# Patient Record
Sex: FEMALE | Race: WHITE | Employment: OTHER | URBAN - METROPOLITAN AREA
[De-identification: names, ages, dates, MRNs, and addresses within clinical notes are randomized per-mention and may not be internally consistent; named-entity substitution may affect disease eponyms.]

---

## 2017-01-11 ENCOUNTER — TELEPHONE (OUTPATIENT)
Dept: FAMILY MEDICINE | Facility: OTHER | Age: 67
End: 2017-01-11

## 2017-01-11 NOTE — TELEPHONE ENCOUNTER
Summary:    Patient is due/failing the following:   Dexa scan  and MAMMOGRAM    Action needed:   Schedule a mammogram and Dexa scan     Type of outreach:    Phone, left message for patient to call back.     Questions for provider review:    None                                                                                                                                    Toya Rizo       Chart routed to Care Team .      Panel Management Review      Patient has the following on her problem list:     Depression / Dysthymia review  PHQ-9 SCORE 4/30/2014 5/13/2016 11/11/2016   Total Score 0 - -   Total Score - 0 0      Patient is due for:  None    Asthma review     ACT Total Scores 12/14/2016   ACT TOTAL SCORE (Goal Greater than or Equal to 20) 17   In the past 12 months, how many times did you visit the emergency room for your asthma without being admitted to the hospital? 0   In the past 12 months, how many times were you hospitalized overnight because of your asthma? 0      1. Is Asthma diagnosis on the Problem List? Yes    2. Is Asthma listed on Health Maintenance? Yes    3. Patient is due for:  ACT      Composite cancer screening  Chart review shows that this patient is due/due soon for the following Mammogram

## 2017-01-11 NOTE — Clinical Note
[facility]  [address]   [place] [address]  Phone: [phone]  January 16, 2017      Bharath Kennedy  [address]  [facility] [address]      Dear Bharath,    We care about your health and have reviewed your health plan including your medical conditions, medications, and lab results.  Based on this review, it is recommended that you follow up regarding the following health topic(s):  -Breast Cancer Screening    We recommend you take the following action(s):  -schedule a MAMMOGRAM which is due. Please disregard this reminder if you have had this exam elsewhere within the last 1-2 years please let us know so we can update your records.     Please call us at the [facility] - [phone] (or use [employer]) to address the above recommendations.     Thank you for trusting [facility] and we appreciate the opportunity to serve you.  We look forward to supporting your healthcare needs in the future.    Healthy Regards,    Your Health Care Team  [facility]

## 2017-04-05 NOTE — PROGRESS NOTES
[address]  [facility] [address]  [phone]  Dept: [phone]    PRE-OP EVALUATION:  Today's date: 2017    Bharath Kennedy (: 1950) presents for pre-operative evaluation assessment as requested by Dr. Miles Hayes.  She requires evaluation and anesthesia risk assessment prior to undergoing surgery/procedure for treatment of Cataract .  Proposed procedure: Cataract surgery    Date of Surgery/ Procedure: 17, 17  Time of Surgery/ Procedure: [facility]  Hospital/Surgical Facility: [facility]  Fax number for surgical facility: [phone]  Primary Physician: Precious Hay  Type of Anesthesia Anticipated: to be determined    Patient has a Health Care Directive or Living Will:  NO    Preop Questions 4/10/2017   1.  Do you have a history of heart attack, stroke, stent, bypass or surgery on an artery in the head, neck, heart or legs? No   2.  Do you ever have any pain or discomfort in your chest? No   3.  Do you have a history of  Heart Failure? No   4.   Are you troubled by shortness of breath when:  walking on a level surface, or up a slight hill, or at night? No   5.  Do you currently have a cold, bronchitis or other respiratory infection? No   6.  Do you have a cough, shortness of breath, or wheezing? No   7.  Do you sometimes get pains in the calves of your legs when you walk? No   8. Do you or anyone in your family have previous history of blood clots? No   9.  Do you or does anyone in your family have a serious bleeding problem such as prolonged bleeding following surgeries or cuts? No   10. Have you ever had problems with anemia or been told to take iron pills? No   11. Have you had any abnormal blood loss such as black, tarry or bloody stools, or abnormal vaginal bleeding? No   12. Have you ever had a blood transfusion? No   13. Have you or any of your relatives ever had problems with anesthesia? No   14. Do you have sleep apnea,  excessive snoring or daytime drowsiness? No   15. Do you have any prosthetic heart valves? No   16. Do you have prosthetic joints? No   17. Is there any chance that you may be pregnant? No         HPI:                                                      Brief HPI related to upcoming procedure: cataracts      See problem list for active medical problems.  Problems all longstanding and stable, except as noted/documented.  See ROS for pertinent symptoms related to these conditions.                                                                                                  .    MEDICAL HISTORY:                                                      Patient Active Problem List    Diagnosis Date Noted     Moderate persistent asthma without complication 12/14/2016     Priority: Medium     Allergic rhinitis due to dust mite 12/14/2016     Priority: Medium     Allergic rhinitis due to cat hair 12/14/2016     Priority: Medium     Seasonal allergic rhinitis due to pollen 12/14/2016     Priority: Medium     Environmental allergies 11/15/2016     Priority: Medium     Major depression in complete remission (H) 01/09/2012     Priority: Medium     Anxiety 06/25/2008     Priority: Medium      Past Medical History:   Diagnosis Date     Acne vulgaris      Depressive disorder, not elsewhere classified      Displacement of intervertebral disc, site unspecified, without myelopathy     L5-S1      Moderate persistent asthma without complication 12/14/2016     Obesity, unspecified      Past Surgical History:   Procedure Laterality Date     COLONOSCOPY  2008    [facility]     HC TOOTH EXTRACTION W/FORCEP       LAMINECT/DISCECTOMY, LUMBAR  06/26/08    Left L5-S1 discectomy for herniated disc & left leg pain.     TONSILLECTOMY       Current Outpatient Prescriptions   Medication Sig Dispense Refill     citalopram (CELEXA) 20 MG tablet Take 1 tablet (20 mg) by mouth daily 90 tablet 3     butalbital-acetaminophen-caffeine (FIORICET/ESGIC)  "-40 MG per tablet TAKE ONE TABLET BY MOUTH EVERY 4 HOURS AS NEEDED FOR PAIN 30 tablet 0     [DISCONTINUED] citalopram (CELEXA) 20 MG tablet Take 1 tablet (20 mg) by mouth daily 90 tablet 3     OTC products: None, except as noted above    Allergies   Allergen Reactions     Amoxicillin      fever     Nitrofurantoin      Fever at noc     Penicillins      Rash/itch        Latex Allergy: NO    Social History   Substance Use Topics     Smoking status: Never Smoker     Smokeless tobacco: Never Used      Comment: no smokers in household     Alcohol use No     History   Drug Use No       REVIEW OF SYSTEMS:                                                    C: NEGATIVE for fever, chills, change in weight  I: NEGATIVE for worrisome rashes, moles or lesions  EYES:  Bilateral cataracts  E/M: NEGATIVE for ear, mouth and throat problems  R: NEGATIVE for significant cough or SOB  CV: NEGATIVE for chest pain, palpitations or peripheral edema  GI: NEGATIVE for nausea, abdominal pain, heartburn, or change in bowel habits  : NEGATIVE for frequency, dysuria, or hematuria  M: NEGATIVE for significant arthralgias or myalgia  N: NEGATIVE for weakness, dizziness or paresthesias  E: NEGATIVE for temperature intolerance, skin/hair changes  P: NEGATIVE for changes in mood or affect    EXAM:                                                    /70  Pulse 76  Temp 97.5  F (36.4  C) (Temporal)  Resp 12  Ht 5' 4.76\" (1.645 m)  Wt 210 lb (95.3 kg)  LMP 01/01/2005  BMI 35.2 kg/m2    GENERAL APPEARANCE: healthy, alert and no distress     EYES: EOMI, PERRL     HENT: ear canals and TM's normal and nose and mouth without ulcers or lesions     NECK: no adenopathy, no asymmetry, masses, or scars and thyroid normal to palpation     RESP: lungs clear to auscultation - no rales, rhonchi or wheezes     CV: regular rates and rhythm, normal S1 S2, no S3 or S4 and no murmur, click or rub     ABDOMEN:  soft, nontender, no HSM or masses and " bowel sounds normal     MS: extremities normal- no gross deformities noted, no evidence of inflammation in joints, FROM in all extremities.Has ecchymoses and healing cuts on left lower leg.     SKIN: no suspicious lesions or rashes     NEURO: Normal strength and tone, sensory exam grossly normal, mentation intact and speech normal     PSYCH: mentation appears normal. and affect normal/bright    DIAGNOSTICS:                                                    No labs or EKG required for low risk surgery (cataract, skin procedure, breast biopsy, etc)    Recent Labs   Lab Test  11/11/16   0913  05/13/16   0834   HGB  13.6   --    PLT  238   --    A1C   --   5.9     IMPRESSION:                                                    Reason for surgery/procedure: cataract    The proposed surgical procedure is considered LOW risk.    REVISED CARDIAC RISK INDEX  The patient has the following serious cardiovascular risks for perioperative complications such as (MI, PE, VFib and 3  AV Block):  No serious cardiac risks  INTERPRETATION: 0 risks: Class I (very low risk - 0.4% complication rate)    The patient has the following additional risks for perioperative complications:  No identified additional risks      ICD-10-CM    1. Preop general physical exam Z01.818    2. Cataract H26.9    3. Major depression in complete remission (H) F32.5 citalopram (CELEXA) 20 MG tablet   4. Anxiety F41.9 citalopram (CELEXA) 20 MG tablet       RECOMMENDATIONS:                                                          --Patient is to take all scheduled medications on the day of surgery    APPROVAL GIVEN to proceed with proposed procedure, without further diagnostic evaluation       Signed Electronically by: Precious Hay MD    Copy of this evaluation report is provided to requesting physician.    [facility] Preop Guidelines

## 2017-04-11 ENCOUNTER — OFFICE VISIT (OUTPATIENT)
Dept: FAMILY MEDICINE | Facility: OTHER | Age: 67
End: 2017-04-11
Payer: COMMERCIAL

## 2017-04-11 VITALS
BODY MASS INDEX: 34.99 KG/M2 | SYSTOLIC BLOOD PRESSURE: 122 MMHG | TEMPERATURE: 97.5 F | RESPIRATION RATE: 12 BRPM | HEIGHT: 65 IN | HEART RATE: 76 BPM | WEIGHT: 210 LBS | DIASTOLIC BLOOD PRESSURE: 70 MMHG

## 2017-04-11 DIAGNOSIS — F32.5 MAJOR DEPRESSION IN COMPLETE REMISSION (H): ICD-10-CM

## 2017-04-11 DIAGNOSIS — H26.9 CATARACT: ICD-10-CM

## 2017-04-11 DIAGNOSIS — F41.9 ANXIETY: ICD-10-CM

## 2017-04-11 DIAGNOSIS — Z01.818 PREOP GENERAL PHYSICAL EXAM: Primary | ICD-10-CM

## 2017-04-11 PROCEDURE — 99214 OFFICE O/P EST MOD 30 MIN: CPT | Performed by: FAMILY MEDICINE

## 2017-04-11 RX ORDER — CITALOPRAM HYDROBROMIDE 20 MG/1
20 TABLET ORAL DAILY
Qty: 90 TABLET | Refills: 3 | Status: SHIPPED | OUTPATIENT
Start: 2017-04-11 | End: 2018-05-02

## 2017-04-11 NOTE — MR AVS SNAPSHOT
After Visit Summary   4/11/2017    Bharath Kennedy    MRN: [id number]           Patient Information     Date Of Birth          1950        Visit Information        Provider Department      4/11/2017 11:40 AM Precious Hay MD [facility]        Today's Diagnoses     Preop general physical exam    -  1    Cataract        Major depression in complete remission (H)        Anxiety          Care Instructions      Before Your Surgery      Call your surgeon if there is any change in your health. This includes signs of a cold or flu (such as a sore throat, runny nose, cough, rash or fever).    Do not smoke, drink alcohol or take over the counter medicine (unless your surgeon or primary care doctor tells you to) for the 24 hours before and after surgery.    If you take prescribed drugs: Follow your doctor s orders about which medicines to take and which to stop until after surgery.    Eating and drinking prior to surgery: follow the instructions from your surgeon    Take a shower or bath the night before surgery. Use the soap your surgeon gave you to gently clean your skin. If you do not have soap from your surgeon, use your regular soap. Do not shave or scrub the surgery site.  Wear clean pajamas and have clean sheets on your bed.         Follow-ups after your visit        Who to contact     If you have questions or need follow up information about today's clinic visit or your schedule please contact [facility] directly at [phone].  Normal or non-critical lab and imaging results will be communicated to you by MyChart, letter or phone within 4 business days after the clinic has received the results. If you do not hear from us within 7 days, please contact the clinic through MyChart or phone. If you have a critical or abnormal lab result, we will notify you by phone as soon as possible.  Submit refill requests through [employer] or call your pharmacy and they will  "forward the refill request to us. Please allow 3 business days for your refill to be completed.          Additional Information About Your Visit        [employer] Information     Private Company gives you secure access to your electronic health record. If you see a primary care provider, you can also send messages to your care team and make appointments. If you have questions, please call your primary care clinic.  If you do not have a primary care provider, please call [phone] and they will assist you.        Care EveryWhere ID     This is your Care EveryWhere ID. This could be used by other organizations to access your [place] medical records  [id number]        Your Vitals Were     Pulse Temperature Respirations Height Last Period BMI (Body Mass Index)    76 97.5  F (36.4  C) (Temporal) 12 5' 4.76\" (1.645 m) 01/01/2005 35.2 kg/m2       Blood Pressure from Last 3 Encounters:   04/11/17 122/70   12/14/16 126/82   11/11/16 118/60    Weight from Last 3 Encounters:   04/11/17 210 lb (95.3 kg)   12/14/16 209 lb (94.8 kg)   05/13/16 206 lb (93.4 kg)              Today, you had the following     No orders found for display         Today's Medication Changes          These changes are accurate as of: 4/11/17 12:29 PM.  If you have any questions, ask your nurse or doctor.               Stop taking these medicines if you haven't already. Please contact your care team if you have questions.     Albuterol Sulfate 108 (90 BASE) MCG/ACT Aepb   Stopped by:  Precious Hay MD           fluticasone 50 MCG/ACT spray   Commonly known as:  FLONASE   Stopped by:  Precious Hay MD           fluticasone furoate 100 MCG/ACT Aepb inhalation powder   Commonly known as:  ARNUITY ELLIPTA   Stopped by:  Precious Hay MD                Where to get your medicines      These medications were sent to [facility] [address] [place] [address] [facility]  [address] [facility] [address]     Phone:  [phone]    "  citalopram 20 MG tablet                Primary Care Provider Office Phone # Fax #    Precious BLANKENSHIP MD [place] [phone]       [facility] [address]  [facility] [address]        Thank you!     Thank you for choosing [facility]  for your care. Our goal is always to provide you with excellent care. Hearing back from our patients is one way we can continue to improve our services. Please take a few minutes to complete the written survey that you may receive in the mail after your visit with us. Thank you!             Your Updated Medication List - Protect others around you: Learn how to safely use, store and throw away your medicines at www.disposemymeds.org.          This list is accurate as of: 4/11/17 12:29 PM.  Always use your most recent med list.                   Brand Name Dispense Instructions for use    butalbital-acetaminophen-caffeine -40 MG per tablet    FIORICET/ESGIC    30 tablet    TAKE ONE TABLET BY MOUTH EVERY 4 HOURS AS NEEDED FOR PAIN       citalopram 20 MG tablet    celeXA    90 tablet    Take 1 tablet (20 mg) by mouth daily

## 2017-04-11 NOTE — NURSING NOTE
"Chief Complaint   Patient presents with     Pre-Op Exam     Panel Management     mammogram, Dexa, honoring choices, DAP, PHQ9       Initial /70  Pulse 76  Temp 97.5  F (36.4  C) (Temporal)  Resp 12  Ht 5' 4.76\" (1.645 m)  Wt 210 lb (95.3 kg)  LMP 01/01/2005  BMI 35.2 kg/m2 Estimated body mass index is 35.2 kg/(m^2) as calculated from the following:    Height as of this encounter: 5' 4.76\" (1.645 m).    Weight as of this encounter: 210 lb (95.3 kg).  Medication Reconciliation: complete   Gale Washington, ALEJO    "

## 2017-05-08 ENCOUNTER — TELEPHONE (OUTPATIENT)
Dept: FAMILY MEDICINE | Facility: OTHER | Age: 67
End: 2017-05-08

## 2017-05-08 NOTE — LETTER
[facility]  [address]   [facility] [address]  Phone: [phone]  May 31, 2017      Bharath Kennedy  [address] [facility] [address]      Dear Bharath,    We care about your health and have reviewed your health plan including your medical conditions, medications, and lab results.  Based on this review, it is recommended that you follow up regarding the following health topic(s):  -Depression  -Breast Cancer Screening    We recommend you take the following action(s):  -schedule a MAMMOGRAM which is due. Please disregard this reminder if you have had this exam elsewhere within the last 1-2 years please let us know so we can update your records.  -Complete and return the attached PHQ-9 Form.  If your total score is greater than 9, please schedule a followup appointment.  If you answer Yes to question 9, call your clinic between the hours of 8 to 5.  You may also call the Suicide Hotline at 1-585-644-WPJM (8180) any time.     Please call us at the [facility] - [phone] (or use [employer]) to address the above recommendations.     Thank you for trusting [facility] and we appreciate the opportunity to serve you.  We look forward to supporting your healthcare needs in the future.    Healthy Regards,    Your Health Care Team  [facility]

## 2017-08-22 ENCOUNTER — TELEPHONE (OUTPATIENT)
Dept: FAMILY MEDICINE | Facility: OTHER | Age: 67
End: 2017-08-22

## 2017-08-22 NOTE — TELEPHONE ENCOUNTER
Summary:    Patient is due/failing the following:   MAMMOGRAM    Action needed:   Schedule a mammogram     Type of outreach:    Phone, spoke to patient.  patient will call back to schedule when ready to complete    Questions for provider review:    None                                                                                                                                    Toya Rizo     Chart routed to Care Team .        Panel Management Review      Patient has the following on her problem list:     Depression / Dysthymia review  PHQ-9 SCORE 4/30/2014 5/13/2016 11/11/2016   Total Score 0 - -   Total Score - 0 0      Patient is due for:  PHQ9        Composite cancer screening  Chart review shows that this patient is due/due soon for the following Mammogram

## 2017-12-22 ENCOUNTER — TELEPHONE (OUTPATIENT)
Dept: FAMILY MEDICINE | Facility: OTHER | Age: 67
End: 2017-12-22

## 2017-12-22 NOTE — TELEPHONE ENCOUNTER
Summary:    Patient is due/failing the following:   MAMMOGRAM    Action needed:   Schedule a mammogram     Type of outreach:    Sent letter.    Questions for provider review:    None                                                                                                                                    Toya Rizo       Chart routed to Care Team .      Panel Management Review      Patient has the following on her problem list:     Depression / Dysthymia review    Measure:  Needs PHQ-9 score of 4 or less during index window.  Administer PHQ-9 and if score is 5 or more, send encounter to provider for next steps.        PHQ-9 SCORE 4/30/2014 5/13/2016 11/11/2016   Total Score 0 - -   Total Score - 0 0       If PHQ-9 recheck is 5 or more, route to provider for next steps.    Patient is due for:  PHQ9        Composite cancer screening  Chart review shows that this patient is due/due soon for the following Mammogram

## 2017-12-22 NOTE — LETTER
[facility]  [address]   [place] [address]  Phone: [phone]  December 22, 2017      Bharath Kennedy  [address]  [facility] [address]      Dear Bharath,    We care about your health and have reviewed your health plan including your medical conditions, medications, and lab results.  Based on this review, it is recommended that you follow up regarding the following health topic(s):  -Breast Cancer Screening    We recommend you take the following action(s):  -schedule a MAMMOGRAM which is due. Please disregard this reminder if you have had this exam elsewhere within the last 1-2 years please let us know so we can update your records.     Please call us at the [facility] - [phone] (or use [employer]) to address the above recommendations.     Thank you for trusting [facility] and we appreciate the opportunity to serve you.  We look forward to supporting your healthcare needs in the future.    Healthy Regards,    Your Health Care Team  [facility]

## 2018-04-16 ENCOUNTER — TELEPHONE (OUTPATIENT)
Dept: FAMILY MEDICINE | Facility: OTHER | Age: 68
End: 2018-04-16

## 2018-04-16 NOTE — TELEPHONE ENCOUNTER
Summary:    Patient is due/failing the following:   MAMMOGRAM, if patient is interested in scheduling a mammogram please transfer to Toya Rizo      Action needed:   Schedule a mammogram     Type of outreach:    Phone, left message for patient to call back.     Questions for provider review:    None                                                                                                                                    Toya Rizo       Chart routed to Care Team .      Panel Management Review      Patient has the following on her problem list:     Depression / Dysthymia review    Measure:  Needs PHQ-9 score of 4 or less during index window.  Administer PHQ-9 and if score is 5 or more, send encounter to provider for next steps.        PHQ-9 SCORE 4/30/2014 5/13/2016 11/11/2016   Total Score 0 - -   Total Score - 0 0       If PHQ-9 recheck is 5 or more, route to provider for next steps.    Patient is due for:  PHQ9    Asthma review     ACT Total Scores 12/14/2016   ACT TOTAL SCORE (Goal Greater than or Equal to 20) 17   In the past 12 months, how many times did you visit the emergency room for your asthma without being admitted to the hospital? 0   In the past 12 months, how many times were you hospitalized overnight because of your asthma? 0      1. Is Asthma diagnosis on the Problem List? Yes    2. Is Asthma listed on Health Maintenance? Yes    3. Patient is due for:  ACT      Composite cancer screening  Chart review shows that this patient is due/due soon for the following Mammogram

## 2018-05-02 ENCOUNTER — HOSPITAL ENCOUNTER (OUTPATIENT)
Dept: LAB | Facility: CLINIC | Age: 68
Discharge: HOME | End: 2018-05-02
Attending: NURSE PRACTITIONER | Admitting: NURSE PRACTITIONER
Payer: COMMERCIAL

## 2018-05-02 ENCOUNTER — OFFICE VISIT (OUTPATIENT)
Dept: FAMILY MEDICINE | Facility: OTHER | Age: 68
End: 2018-05-02
Payer: COMMERCIAL

## 2018-05-02 VITALS
HEART RATE: 68 BPM | HEIGHT: 65 IN | TEMPERATURE: 98.7 F | RESPIRATION RATE: 16 BRPM | BODY MASS INDEX: 34.59 KG/M2 | SYSTOLIC BLOOD PRESSURE: 120 MMHG | WEIGHT: 207.6 LBS | DIASTOLIC BLOOD PRESSURE: 80 MMHG

## 2018-05-02 DIAGNOSIS — H61.23 BILATERAL IMPACTED CERUMEN: ICD-10-CM

## 2018-05-02 DIAGNOSIS — G44.219 EPISODIC TENSION-TYPE HEADACHE, NOT INTRACTABLE: ICD-10-CM

## 2018-05-02 DIAGNOSIS — Z23 NEED FOR PROPHYLACTIC VACCINATION AGAINST STREPTOCOCCUS PNEUMONIAE (PNEUMOCOCCUS): ICD-10-CM

## 2018-05-02 DIAGNOSIS — Z78.0 ASYMPTOMATIC POSTMENOPAUSAL STATUS: ICD-10-CM

## 2018-05-02 DIAGNOSIS — F41.9 ANXIETY: ICD-10-CM

## 2018-05-02 DIAGNOSIS — F32.5 MAJOR DEPRESSION IN COMPLETE REMISSION (H): ICD-10-CM

## 2018-05-02 DIAGNOSIS — R73.01 IMPAIRED FASTING GLUCOSE: ICD-10-CM

## 2018-05-02 DIAGNOSIS — J45.40 MODERATE PERSISTENT ASTHMA WITHOUT COMPLICATION: ICD-10-CM

## 2018-05-02 DIAGNOSIS — Z00.00 MEDICARE ANNUAL WELLNESS VISIT, SUBSEQUENT: Primary | ICD-10-CM

## 2018-05-02 DIAGNOSIS — Z13.220 SCREENING FOR HYPERLIPIDEMIA: ICD-10-CM

## 2018-05-02 DIAGNOSIS — J30.1 CHRONIC SEASONAL ALLERGIC RHINITIS DUE TO POLLEN: ICD-10-CM

## 2018-05-02 DIAGNOSIS — Z12.31 VISIT FOR SCREENING MAMMOGRAM: ICD-10-CM

## 2018-05-02 LAB
ALBUMIN SERPL-MCNC: 3.8 G/DL (ref 3.4–5)
ALP SERPL-CCNC: 94 U/L (ref 40–150)
ALT SERPL W P-5'-P-CCNC: 27 U/L (ref 0–50)
ANION GAP SERPL CALCULATED.3IONS-SCNC: 6 MMOL/L (ref 3–14)
AST SERPL W P-5'-P-CCNC: 18 U/L (ref 0–45)
BILIRUB SERPL-MCNC: 0.4 MG/DL (ref 0.2–1.3)
BUN SERPL-MCNC: 12 MG/DL (ref 7–30)
CALCIUM SERPL-MCNC: 8.8 MG/DL (ref 8.5–10.1)
CHLORIDE SERPL-SCNC: 109 MMOL/L (ref 94–109)
CHOLEST SERPL-MCNC: 199 MG/DL
CO2 SERPL-SCNC: 27 MMOL/L (ref 20–32)
CREAT SERPL-MCNC: 0.68 MG/DL (ref 0.52–1.04)
GFR SERPL CREATININE-BSD FRML MDRD: 86 ML/MIN/1.7M2
GLUCOSE SERPL-MCNC: 97 MG/DL (ref 70–99)
HBA1C MFR BLD: 5.8 % (ref 0–6.4)
HDLC SERPL-MCNC: 46 MG/DL
LDLC SERPL CALC-MCNC: 138 MG/DL
NONHDLC SERPL-MCNC: 153 MG/DL
POTASSIUM SERPL-SCNC: 4.3 MMOL/L (ref 3.4–5.3)
PROT SERPL-MCNC: 7.4 G/DL (ref 6.8–8.8)
SODIUM SERPL-SCNC: 142 MMOL/L (ref 133–144)
TRIGL SERPL-MCNC: 74 MG/DL

## 2018-05-02 PROCEDURE — 90732 PPSV23 VACC 2 YRS+ SUBQ/IM: CPT | Performed by: NURSE PRACTITIONER

## 2018-05-02 PROCEDURE — 80053 COMPREHEN METABOLIC PANEL: CPT | Performed by: NURSE PRACTITIONER

## 2018-05-02 PROCEDURE — G0009 ADMIN PNEUMOCOCCAL VACCINE: HCPCS | Performed by: NURSE PRACTITIONER

## 2018-05-02 PROCEDURE — 36415 COLL VENOUS BLD VENIPUNCTURE: CPT | Performed by: NURSE PRACTITIONER

## 2018-05-02 PROCEDURE — 83036 HEMOGLOBIN GLYCOSYLATED A1C: CPT | Performed by: NURSE PRACTITIONER

## 2018-05-02 PROCEDURE — G0438 PPPS, INITIAL VISIT: HCPCS | Performed by: NURSE PRACTITIONER

## 2018-05-02 PROCEDURE — 80061 LIPID PANEL: CPT | Performed by: NURSE PRACTITIONER

## 2018-05-02 RX ORDER — BUTALBITAL, ACETAMINOPHEN AND CAFFEINE 50; 325; 40 MG/1; MG/1; MG/1
TABLET ORAL
Qty: 30 TABLET | Refills: 0 | Status: SHIPPED | OUTPATIENT
Start: 2018-05-02 | End: 2019-05-03

## 2018-05-02 RX ORDER — CITALOPRAM HYDROBROMIDE 20 MG/1
20 TABLET ORAL DAILY
Qty: 90 TABLET | Refills: 3 | Status: SHIPPED | OUTPATIENT
Start: 2018-05-02 | End: 2019-03-29

## 2018-05-02 ASSESSMENT — PAIN SCALES - GENERAL: PAINLEVEL: NO PAIN (0)

## 2018-05-02 ASSESSMENT — ENCOUNTER SYMPTOMS
CONSTIPATION: 0
DIZZINESS: 0
EYES NEGATIVE: 1
CHILLS: 0
FEVER: 0
NERVOUS/ANXIOUS: 0
RESPIRATORY NEGATIVE: 1
NERVOUS/ANXIOUS: 1
CARDIOVASCULAR NEGATIVE: 1
HEMATOCHEZIA: 0
EYE PAIN: 0
ENDOCRINE NEGATIVE: 1
HEMATOLOGIC/LYMPHATIC NEGATIVE: 1
HEMATURIA: 0
GASTROINTESTINAL NEGATIVE: 1
CONSTITUTIONAL NEGATIVE: 1
ABDOMINAL PAIN: 0
MUSCULOSKELETAL NEGATIVE: 1
FREQUENCY: 0
COUGH: 0
HEADACHES: 1
DIARRHEA: 0

## 2018-05-02 ASSESSMENT — ACTIVITIES OF DAILY LIVING (ADL)
I_NEED_ASSISTANCE_FOR_THE_FOLLOWING_DAILY_ACTIVITIES:: NO ASSISTANCE IS NEEDED
CURRENT_FUNCTION: NO ASSISTANCE NEEDED

## 2018-05-02 NOTE — NURSING NOTE
"Chief Complaint   Patient presents with     Physical       Initial /80  Pulse 68  Temp 98.7  F (37.1  C) (Oral)  Resp 16  Ht 5' 5\" (1.651 m)  Wt 207 lb 9.6 oz (94.2 kg)  LMP 01/01/2005  BMI 34.55 kg/m2 Estimated body mass index is 34.55 kg/(m^2) as calculated from the following:    Height as of this encounter: 5' 5\" (1.651 m).    Weight as of this encounter: 207 lb 9.6 oz (94.2 kg).  Medication Reconciliation: complete  Jihan Sterling MA    "

## 2018-05-02 NOTE — NURSING NOTE
Prior to injection verified patient identity using patient's name and date of birth.    Screening Questionnaire for Adult Immunization     Are you sick today?   No    Do you have allergies to medications, food or any vaccine?   No    Have you ever had a serious reaction after receiving a vaccination?   No    Do you have a long-term health problem with heart disease, lung disease,  asthma, kidney disease, diabetes, anemia, metabolic or blood disease?   No    Do you have cancer, leukemia, AIDS, or any immune system problem?   No    Do you take cortisone, prednisone, other steroids, or anticancer drugs, or  have you had any x-ray (radiation) treatments?   No    Have you had a seizure, brain, or other nervous system problem?   No    During the past year, have you received a transfusion of blood or blood       products, or been given a medicine called immune (gamma) globulin?   No    For women: Are you pregnant or is there a chance you could become         pregnant during the next month?   No    Have you received any vaccinations in the past 4 weeks?   No     Immunization questionnaire answers were all negative.        Per orders of Destinee Richardson, injection of Pneumococcal 23 given by Rowan Esqueda. Patient instructed to remain in clinic for 20 minutes afterwards, and to report any adverse reaction to me immediately.       Screening performed by Rowan Esqueda on 5/2/2018 at 10:29 AM.

## 2018-05-02 NOTE — MR AVS SNAPSHOT
After Visit Summary   5/2/2018    Bharath Kennedy    MRN: [id number]           Patient Information     Date Of Birth          1950        Visit Information        Provider Department      5/2/2018 9:00 AM Destinee Richardson APRN [facility] ([facility])        Today's Diagnoses     Medicare annual wellness visit, subsequent    -  1    Moderate persistent asthma without complication        Chronic seasonal allergic rhinitis due to pollen        Major depression in complete remission (H)        Anxiety        Episodic tension-type headache, not intractable        Asymptomatic postmenopausal status        Visit for screening mammogram        Need for prophylactic vaccination against Streptococcus pneumoniae (pneumococcus)        Screening for hyperlipidemia        Impaired fasting glucose          Care Instructions      Preventive Health Recommendations  Female Ages 65 +    Yearly exam:     See your health care provider every year in order to  o Review health changes.   o Discuss preventive care.    o Review your medicines if your doctor has prescribed any.      You no longer need a yearly Pap test unless you've had an abnormal Pap test in the past 10 years. If you have vaginal symptoms, such as bleeding or discharge, be sure to talk with your provider about a Pap test.      Every 1 to 2 years, have a mammogram.  If you are over 69, talk with your health care provider about whether or not you want to continue having screening mammograms.      Every 10 years, have a colonoscopy. Or, have a yearly FIT test (stool test). These exams will check for colon cancer.       Have a cholesterol test every 5 years, or more often if your doctor advises it.       Have a diabetes test (fasting glucose) every three years. If you are at risk for diabetes, you should have this test more often.       At age 65, have a bone density scan (DEXA) to check for osteoporosis (brittle bone disease).    Shots:    Get a flu  shot each year.    Get a tetanus shot every 10 years.    Talk to your doctor about your pneumonia vaccines. There are now two you should receive - Pneumovax (PPSV 23) and Prevnar (PCV 13).    Talk to your doctor about the shingles vaccine.    Talk to your doctor about the hepatitis B vaccine.    Nutrition:     Eat at least 5 servings of fruits and vegetables each day.      Eat whole-grain bread, whole-wheat pasta and brown rice instead of white grains and rice.      Talk to your provider about Calcium and Vitamin D.     Lifestyle    Exercise at least 150 minutes a week (30 minutes a day, 5 days a week). This will help you control your weight and prevent disease.      Limit alcohol to one drink per day.      No smoking.       Wear sunscreen to prevent skin cancer.       See your dentist twice a year for an exam and cleaning.      See your eye doctor every 1 to 2 years to screen for conditions such as glaucoma, macular degeneration, cataracts, etc           Follow-ups after your visit        Follow-up notes from your care team     Return in about 1 year (around 5/2/2019).      Future tests that were ordered for you today     Open Future Orders        Priority Expected Expires Ordered    DEXA HIP/PELVIS/SPINE - Future Routine  5/2/2019 5/2/2018    MA SCREENING DIGITAL BILAT - Future  (s+30) Routine  5/2/2019 5/2/2018            Who to contact     If you have questions or need follow up information about today's clinic visit or your schedule please contact [facility] directly at [phone].  Normal or non-critical lab and imaging results will be communicated to you by MyChart, letter or phone within 4 business days after the clinic has received the results. If you do not hear from us within 7 days, please contact the clinic through [employer] or phone. If you have a critical or abnormal lab result, we will notify you by phone as soon as possible.  Submit refill requests through [employer] or call your  "pharmacy and they will forward the refill request to us. Please allow 3 business days for your refill to be completed.          Additional Information About Your Visit        [employer] Information     [employer] gives you secure access to your electronic health record. If you see a primary care provider, you can also send messages to your care team and make appointments. If you have questions, please call your primary care clinic.  If you do not have a primary care provider, please call [phone] and they will assist you.        Care EveryWhere ID     This is your Care EveryWhere ID. This could be used by other organizations to access your [place] medical records  [id number]        Your Vitals Were     Pulse Temperature Respirations Height Last Period BMI (Body Mass Index)    68 98.7  F (37.1  C) (Oral) 16 5' 5\" (1.651 m) 01/01/2005 34.55 kg/m2       Blood Pressure from Last 3 Encounters:   05/02/18 120/80   04/11/17 122/70   12/14/16 126/82    Weight from Last 3 Encounters:   05/02/18 207 lb 9.6 oz (94.2 kg)   04/11/17 210 lb (95.3 kg)   12/14/16 209 lb (94.8 kg)              We Performed the Following          ADMIN VACCINE, ADDL [19985]     Comprehensive metabolic panel     Hemoglobin A1c     Lipid Profile (Chol, Trig, HDL, LDL calc)     Pneumococcal vaccine 23 valent PPSV23  (Pneumovax) [87919]          Today's Medication Changes          These changes are accurate as of 5/2/18 10:04 AM.  If you have any questions, ask your nurse or doctor.               These medicines have changed or have updated prescriptions.        Dose/Directions    butalbital-acetaminophen-caffeine -40 MG per tablet   Commonly known as:  FIORICET/ESGIC   This may have changed:  See the new instructions.   Used for:  Episodic tension-type headache, not intractable   Changed by:  Destinee Richardson APRN CNP        TAKE ONE TABLET BY MOUTH EVERY 4 HOURS AS NEEDED FOR PAIN   Quantity:  30 tablet   Refills:  0            Where to get " your medicines      These medications were sent to [facility] [address] [place], [place] - [address] [address] [facility]  [address] [address] [facility] [address]     Phone:  [phone]     citalopram 20 MG tablet         Some of these will need a paper prescription and others can be bought over the counter.  Ask your nurse if you have questions.     Bring a paper prescription for each of these medications     butalbital-acetaminophen-caffeine -40 MG per tablet                Primary Care Provider Office Phone # Fax #    Precious Hay [phone]       [address]  [facility] [address]        Equal Access to Services     [facility]: Hadii kristyn estrada hadasho Soomaali, waaxda luqadaha, qaybta kaalmada adeegyada, jason miller . So [facility] [phone].    ATENCIÓN: Si habla español, tiene a walker disposición servicios gratuitos de asistencia lingüística. [facility] [phone].    We comply with applicable federal civil rights laws and [place] laws. We do not discriminate on the basis of race, color, national origin, age, disability, sex, sexual orientation, or gender identity.            Thank you!     Thank you for choosing [facility]  for your care. Our goal is always to provide you with excellent care. Hearing back from our patients is one way we can continue to improve our services. Please take a few minutes to complete the written survey that you may receive in the mail after your visit with us. Thank you!             Your Updated Medication List - Protect others around you: Learn how to safely use, store and throw away your medicines at www.disposemymeds.org.          This list is accurate as of 5/2/18 10:04 AM.  Always use your most recent med list.                   Brand Name Dispense Instructions for use Diagnosis    butalbital-acetaminophen-caffeine -40 MG per tablet    FIORICET/ESGIC    30 tablet    TAKE ONE TABLET BY MOUTH EVERY 4  HOURS AS NEEDED FOR PAIN    Episodic tension-type headache, not intractable       citalopram 20 MG tablet    celeXA    90 tablet    Take 1 tablet (20 mg) by mouth daily    Major depression in complete remission (H), Anxiety

## 2018-05-02 NOTE — PROGRESS NOTES
SUBJECTIVE:   CC: Bharath Kennedy is an 67 year old woman who presents for preventive health visit.     Physical   Annual:     Getting at least 3 servings of Calcium per day::  Yes    Bi-annual eye exam::  Yes    Dental care twice a year::  Yes    Sleep apnea or symptoms of sleep apnea::  None    Diet::  Regular (no restrictions)    Frequency of exercise::  2-3 days/week    Duration of exercise::  45-60 minutes    Taking medications regularly::  Yes    Medication side effects::  None    Additional concerns today::  YES    Ability to successfully perform activities of daily living: no assistance needed  Home Safety:  No safety concerns identified  Hearing Impairment: no hearing concerns      BP Readings from Last 3 Encounters:   05/02/18 120/80   04/11/17 122/70   12/14/16 126/82           Today's PHQ-2 Score:   PHQ-2 ( 1999 [employer]) 5/2/2018   Q1: Little interest or pleasure in doing things 0   Q2: Feeling down, depressed or hopeless 0   PHQ-2 Score 0   Q1: Little interest or pleasure in doing things Not at all   Q2: Feeling down, depressed or hopeless Not at all   PHQ-2 Score 0       Abuse: Current or Past(Physical, Sexual or Emotional)- No  Do you feel safe in your environment - Yes    Social History   Substance Use Topics     Smoking status: Never Smoker     Smokeless tobacco: Never Used      Comment: no smokers in household     Alcohol use No     Alcohol Use 5/2/2018   If you drink alcohol do you typically have greater than 3 drinks per day OR greater than 7 drinks per week? No       Reviewed orders with patient.  Reviewed health maintenance and updated orders accordingly - Yes  BP Readings from Last 3 Encounters:   05/02/18 120/80   04/11/17 122/70   12/14/16 126/82    Wt Readings from Last 3 Encounters:   05/02/18 207 lb 9.6 oz (94.2 kg)   04/11/17 210 lb (95.3 kg)   12/14/16 209 lb (94.8 kg)                  Current Outpatient Prescriptions   Medication Sig Dispense Refill     citalopram (CELEXA) 20 MG tablet  "Take 1 tablet (20 mg) by mouth daily 90 tablet 3     butalbital-acetaminophen-caffeine (FIORICET/ESGIC) -40 MG per tablet TAKE ONE TABLET BY MOUTH EVERY 4 HOURS AS NEEDED FOR PAIN 30 tablet 0       Patient over age 50, mutual decision to screen reflected in health maintenance.    Pertinent mammograms are reviewed under the imaging tab.  History of abnormal Pap smear: NO - age 65 - see link Cervical Cytology Screening Guidelines    Reviewed and updated as needed this visit by clinical staff  Tobacco  Allergies  Meds  Med Hx  Surg Hx  Fam Hx  Soc Hx        Reviewed and updated as needed this visit by Provider        Past Medical History:   Diagnosis Date     Acne vulgaris      Depressive disorder, not elsewhere classified      Displacement of intervertebral disc, site unspecified, without myelopathy     L5-S1      Moderate persistent asthma without complication 12/14/2016     Obesity, unspecified       Past Surgical History:   Procedure Laterality Date     COLONOSCOPY  2008    [facility]     HC TOOTH EXTRACTION W/FORCEP       LAMINECT/DISCECTOMY, LUMBAR  06/26/08    Left L5-S1 discectomy for herniated disc & left leg pain.     TONSILLECTOMY         Review of Systems   Constitutional: Negative.    HENT: Negative.    Eyes: Negative.    Respiratory: Negative.    Cardiovascular: Negative.    Gastrointestinal: Negative.    Endocrine: Negative.    Genitourinary: Negative.    Musculoskeletal: Negative.    Allergic/Immunologic: Positive for environmental allergies.   Neurological: Positive for headaches.   Hematological: Negative.    Psychiatric/Behavioral: Positive for mood changes. The patient is nervous/anxious.      Uses Excedrin if she needs.   OBJECTIVE:   /80  Pulse 68  Temp 98.7  F (37.1  C) (Oral)  Resp 16  Ht 5' 5\" (1.651 m)  Wt 207 lb 9.6 oz (94.2 kg)  LMP 01/01/2005  BMI 34.55 kg/m2  Physical Exam   Constitutional: She is oriented to person, place, and time. She appears well-developed and " well-nourished. No distress.   HENT:   Right Ear: Tympanic membrane and external ear normal.   Left Ear: Tympanic membrane and external ear normal.   Nose: Nose normal.   Mouth/Throat: Oropharynx is clear and moist. No oropharyngeal exudate.   Eyes: Conjunctivae are normal. Pupils are equal, round, and reactive to light. Right eye exhibits no discharge. Left eye exhibits no discharge.   Neck: Neck supple. No tracheal deviation present. No thyromegaly present.   Cardiovascular: Normal rate, regular rhythm, S1 normal, S2 normal, normal heart sounds and normal pulses.  Exam reveals no S3, no S4 and no friction rub.    No murmur heard.  Pulmonary/Chest: Effort normal and breath sounds normal. No respiratory distress. She has no wheezes. She has no rales.   Abdominal: Soft. Bowel sounds are normal. She exhibits no mass. There is no hepatosplenomegaly. There is no tenderness.   Musculoskeletal: Normal range of motion. She exhibits no edema.   Lymphadenopathy:     She has no cervical adenopathy.   Neurological: She is alert and oriented to person, place, and time. She has normal strength and normal reflexes. She exhibits normal muscle tone.   Skin: Skin is warm and dry. No rash noted.   Psychiatric: She has a normal mood and affect. Judgment and thought content normal. Cognition and memory are normal.     Declined breast exam       ASSESSMENT/PLAN:   1. Medicare annual wellness visit, subsequent  - Updated HM     2. Moderate persistent asthma without complication  - Stable, denies changes.     3. Chronic seasonal allergic rhinitis due to pollen  - Stable     4. Major depression in complete remission (H)  - Stable on current dose.   - citalopram (CELEXA) 20 MG tablet; Take 1 tablet (20 mg) by mouth daily  Dispense: 90 tablet; Refill: 3  - Comprehensive metabolic panel    5. Anxiety  - Stable   - citalopram (CELEXA) 20 MG tablet; Take 1 tablet (20 mg) by mouth daily  Dispense: 90 tablet; Refill: 3  - Comprehensive metabolic  "panel    6. Episodic tension-type headache, not intractable  - Stable, very rarely uses Fioricet.   - butalbital-acetaminophen-caffeine (FIORICET/ESGIC) -40 MG per tablet; TAKE ONE TABLET BY MOUTH EVERY 4 HOURS AS NEEDED FOR PAIN  Dispense: 30 tablet; Refill: 0    7. Asymptomatic postmenopausal status    - DEXA HIP/PELVIS/SPINE - Future; Future    8. Visit for screening mammogram    - MA SCREENING DIGITAL BILAT - Future  (s+30); Future    9. Need for prophylactic vaccination against Streptococcus pneumoniae (pneumococcus)    - Pneumococcal vaccine 23 valent PPSV23  (Pneumovax) [96809]  -      ADMIN VACCINE, ADDL [05892]    10. Screening for hyperlipidemia    - Lipid Profile (Chol, Trig, HDL, LDL calc)    11. Impaired fasting glucose    - Hemoglobin A1c    12. Impacted Cerumen  - Recommend ear washes as needed.     COUNSELING:  Reviewed preventive health counseling, as reflected in patient instructions       Regular exercise       Healthy diet/nutrition       Immunizations    Vaccinated for: Pneumococcal               reports that she has never smoked. She has never used smokeless tobacco.    Estimated body mass index is 34.55 kg/(m^2) as calculated from the following:    Height as of this encounter: 5' 5\" (1.651 m).    Weight as of this encounter: 207 lb 9.6 oz (94.2 kg).   Weight management plan: Discussed healthy diet and exercise guidelines and patient will follow up in 6 months in clinic to re-evaluate.    Counseling Resources:  ATP IV Guidelines  Pooled Cohorts Equation Calculator  Breast Cancer Risk Calculator  FRAX Risk Assessment  ICSI Preventive Guidelines  Dietary Guidelines for Americans, 2010  USDA's MyPlate  ASA Prophylaxis  Lung CA Screening    ANASTASIYA Tejeda [facility]    "

## 2018-08-14 ENCOUNTER — TELEPHONE (OUTPATIENT)
Dept: FAMILY MEDICINE | Facility: OTHER | Age: 68
End: 2018-08-14

## 2018-08-14 NOTE — LETTER
[facility]  [address]   [place] [address]  Phone: [phone]  August 14, 2018      Bharath Kennedy  [address]  [place] [address]      Dear Bharath,    We care about your health and have reviewed your health plan including your medical conditions, medications, and lab results.  Based on this review, it is recommended that you follow up regarding the following health topic(s):  -Breast Cancer Screening    We recommend you take the following action(s):  -schedule a MAMMOGRAM which is due. Please disregard this reminder if you have had this exam elsewhere within the last 1-2 years please let us know so we can update your records.     Please call us at the [facility] - [phone] (or use [employer]) to address the above recommendations.     Thank you for trusting [facility] and we appreciate the opportunity to serve you.  We look forward to supporting your healthcare needs in the future.    Healthy Regards,    Your Health Care Team  [facility]

## 2018-08-17 ENCOUNTER — TELEPHONE (OUTPATIENT)
Dept: FAMILY MEDICINE | Facility: OTHER | Age: 68
End: 2018-08-17

## 2018-08-17 NOTE — TELEPHONE ENCOUNTER
Bharath Kennedy is a 68 year old female who calls with left ear and jaw.    NURSING ASSESSMENT:  Description:  Has been having left ear pain since ear cleaning 05/02/2018. Stated had some bleeding that was expected. Started to have sharp pains a week later, and then lead to jaw clicking and occasion jaw pain. Clicking is worse, some pain after eating. Dizziness last night and has not improved. Worsens with bending over. Denies sinus concerns, fevers, chest pain, arm numbness/tingling, shoulder pain.   Onset/duration:  About 2 months   Precip. factors:  After an ear lavage   Associated symptoms:  Ear pain and jaw clicking   Improves/worsens symptoms:  Same   Pain scale (0-10)   Milk to moderate, sharp at times   LMP/preg/breast feeding: Patient's last menstrual period was 01/01/2005.  Last exam/Treatment:  05/02/2018  Allergies:   Allergies   Allergen Reactions     Amoxicillin      fever     Nitrofurantoin      Fever at noc     Penicillins      Rash/itch       NURSING PLAN: Nursing advice to patient to be evaluated in clinic    RECOMMENDED DISPOSITION:  Scheduled with TC per patient request   Will comply with recommendation: Yes  If further questions/concerns or if symptoms do not improve, worsen or new symptoms develop, call your PCP or [facility] as soon as possible.    NOTES:  Disposition was determined by the first positive assessment question, therefore all previous assessment questions were negative    Guideline used:  Telephone Triage Protocols for Nurses, Fifth Edition, Karen Monreal  Jaw pain  Earache, drainage  Nursing Judgment    Mona Murillo RN, BSN

## 2018-08-17 NOTE — TELEPHONE ENCOUNTER
Reason for call:  Patient reporting a symptom    Symptom or request: Clicking in jaw, ear pain     Duration (how long have symptoms been present): 2 months    Have you been treated for this before? No    Additional comments: Patient stated that since she had ear cleaning during visit on 5/2, she has had some sharp ear pain and an intense clicking in her jaw. She would like TC to advise what she should do.     Phone Number patient can be reached at:  Home number on file [phone] (home)    Best Time:  any    Can we leave a detailed message on this number:  YES    Call taken on 8/17/2018 at 1:42 PM by Joe Christina

## 2018-09-10 NOTE — PROGRESS NOTES
[address]  [facility] [address]  [phone]  Dept: [phone]    PRE-OP EVALUATION:  Today's date: 2018    Bharath Kennedy (: 1950) presents for pre-operative evaluation assessment as requested by Dr. Naveen Hayes.  She requires evaluation and anesthesia risk assessment prior to undergoing surgery/procedure for treatment of Cataract (right eye).    Fax number for surgical facility: [phone]  Primary Physician: Precious Hay  Type of Anesthesia Anticipated: Topical    Patient has a Health Care Directive or Living Will:  NO    Preop Questions 2018   Who is doing your surgery? [facility]\Dr.David Hayes   What are you having done? cataract   Date of Surgery/Procedure:    Facility or Hospital where procedure/surgery will be performed: [facility]   1.  Do you have a history of Heart attack, stroke, stent, coronary bypass surgery, or other heart surgery? No   2.  Do you ever have any pain or discomfort in your chest? No   3.  Do you have a history of  Heart Failure? No   4.   Are you troubled by shortness of breath when:  walking on a level surface, or up a slight hill, or at night? No   5.  Do you currently have a cold, bronchitis or other respiratory infection? No   6.  Do you have a cough, shortness of breath, or wheezing? No   7.  Do you sometimes get pains in the calves of your legs when you walk? No   8. Do you or anyone in your family have previous history of blood clots? UNKNOWN - half sister.   9.  Do you or does anyone in your family have a serious bleeding problem such as prolonged bleeding following surgeries or cuts? UNKNOWN - Mother because of being on anticoagulants   10. Have you ever had problems with anemia or been told to take iron pills? No   11. Have you had any abnormal blood loss such as black, tarry or bloody stools, or abnormal vaginal bleeding? No   12. Have you ever had a blood  transfusion? No   13. Have you or any of your relatives ever had problems with anesthesia? No   14. Do you have sleep apnea, excessive snoring or daytime drowsiness? No   15. Do you have any prosthetic heart valves? No   16. Do you have prosthetic joints? No   17. Is there any chance that you may be pregnant? No         HPI:     HPI related to upcoming procedure: pt is scheduled to have a cataract surgery and is here for pre-op eval      See problem list for active medical problems.  Problems all longstanding and stable, except as noted/documented.  See ROS for pertinent symptoms related to these conditions.                                                                                                                                                          .  DEPRESSION - Patient has a long history of Depression of moderate severity requiring medication for control with recent symptoms being stable..Current symptoms of depression include none.                                                                                                                                                                                    .    MEDICAL HISTORY:     Patient Active Problem List    Diagnosis Date Noted     Moderate persistent asthma without complication 12/14/2016     Priority: Medium     Allergic rhinitis due to dust mite 12/14/2016     Priority: Medium     Allergic rhinitis due to cat hair 12/14/2016     Priority: Medium     Seasonal allergic rhinitis due to pollen 12/14/2016     Priority: Medium     Environmental allergies 11/15/2016     Priority: Medium     Major depression in complete remission (H) 01/09/2012     Priority: Medium     Anxiety 06/25/2008     Priority: Medium      Past Medical History:   Diagnosis Date     Acne vulgaris      Depressive disorder, not elsewhere classified      Displacement of intervertebral disc, site unspecified, without myelopathy     L5-S1      Moderate persistent asthma without  "complication 12/14/2016     Obesity, unspecified      Past Surgical History:   Procedure Laterality Date     COLONOSCOPY  2008    [facility]     HC TOOTH EXTRACTION W/FORCEP       LAMINECT/DISCECTOMY, LUMBAR  06/26/08    Left L5-S1 discectomy for herniated disc & left leg pain.     TONSILLECTOMY       Current Outpatient Prescriptions   Medication Sig Dispense Refill     butalbital-acetaminophen-caffeine (FIORICET/ESGIC) -40 MG per tablet TAKE ONE TABLET BY MOUTH EVERY 4 HOURS AS NEEDED FOR PAIN 30 tablet 0     citalopram (CELEXA) 20 MG tablet Take 1 tablet (20 mg) by mouth daily 90 tablet 3     OTC products: None, except as noted above    Allergies   Allergen Reactions     Amoxicillin      fever     Nitrofurantoin      Fever at noc     Penicillins      Rash/itch        Latex Allergy: NO    Social History   Substance Use Topics     Smoking status: Never Smoker     Smokeless tobacco: Never Used      Comment: no smokers in household     Alcohol use No     History   Drug Use No       REVIEW OF SYSTEMS:   Constitutional, neuro, ENT, endocrine, pulmonary, cardiac, gastrointestinal, genitourinary, musculoskeletal, integument and psychiatric systems are negative, except as otherwise noted.    EXAM:   /70  Pulse 71  Temp 97.4  F (36.3  C) (Temporal)  Ht 5' 5\" (1.651 m)  Wt 205 lb (93 kg)  LMP 01/01/2005  SpO2 97%  Breastfeeding? No  BMI 34.11 kg/m2    GENERAL APPEARANCE: healthy, alert and no distress     EYES: EOMI, PERRL     HENT: ear canals and TM's normal and nose and mouth without ulcers or lesions     NECK: no adenopathy, no asymmetry, masses, or scars and thyroid normal to palpation     RESP: lungs clear to auscultation - no rales, rhonchi or wheezes     CV: regular rates and rhythm, normal S1 S2, no S3 or S4 and no murmur, click or rub     ABDOMEN:  soft, nontender, no HSM or masses and bowel sounds normal     MS: extremities normal- no gross deformities noted, no evidence of inflammation in joints, " FROM in all extremities.     SKIN: no suspicious lesions or rashes     NEURO: Normal strength and tone, sensory exam grossly normal, mentation intact and speech normal     PSYCH: mentation appears normal. and affect normal/bright     LYMPHATICS: No cervical adenopathy    DIAGNOSTICS:   No labs or EKG required for low risk surgery (cataract, skin procedure, breast biopsy, etc)    Recent Labs   Lab Test  05/02/18   1025  11/11/16   0913  05/13/16   0834   HGB   --   13.6   --    PLT   --   238   --    NA  142   --    --    POTASSIUM  4.3   --    --    CR  0.68   --    --    A1C  5.8   --   5.9        IMPRESSION:   Reason for surgery/procedure: right eye cataract  Diagnosis/reason for consult: pre-op eval    The proposed surgical procedure is considered LOW risk.    REVISED CARDIAC RISK INDEX  The patient has the following serious cardiovascular risks for perioperative complications such as (MI, PE, VFib and 3  AV Block):  No serious cardiac risks  INTERPRETATION: 0 risks: Class I (very low risk - 0.4% complication rate)    The patient has the following additional risks for perioperative complications:  The 10-year ASCVD risk score (Hancock DC Jr, et al., 2013) is: 5.3%    Values used to calculate the score:      Age: 68 years      Sex: Female      Is Non- : No      Diabetic: No      Tobacco smoker: No      Systolic Blood Pressure: 108 mmHg      Is BP treated: No      HDL Cholesterol: 46 mg/dL      Total Cholesterol: 199 mg/dL      ICD-10-CM    1. Preop general physical exam Z01.818    2. Major depression in complete remission (H) F32.5        RECOMMENDATIONS:         --Patient is to take all scheduled medications on the day of surgery EXCEPT for modifications listed below.  -- Avoid any OTC aspirin, Ibuprofen for 1 week prior to the procedure    APPROVAL GIVEN to proceed with proposed procedure, without further diagnostic evaluation       Signed Electronically by: Rupinder Quintanilla MD    Copy of  this evaluation report is provided to requesting physician.    [facility] Preop Guidelines    Revised Cardiac Risk Index

## 2018-09-14 ENCOUNTER — OFFICE VISIT (OUTPATIENT)
Dept: FAMILY MEDICINE | Facility: OTHER | Age: 68
End: 2018-09-14
Payer: COMMERCIAL

## 2018-09-14 ENCOUNTER — TELEPHONE (OUTPATIENT)
Dept: FAMILY MEDICINE | Facility: OTHER | Age: 68
End: 2018-09-14

## 2018-09-14 VITALS
WEIGHT: 205 LBS | HEIGHT: 65 IN | HEART RATE: 71 BPM | DIASTOLIC BLOOD PRESSURE: 70 MMHG | TEMPERATURE: 97.4 F | OXYGEN SATURATION: 97 % | SYSTOLIC BLOOD PRESSURE: 108 MMHG | BODY MASS INDEX: 34.16 KG/M2

## 2018-09-14 DIAGNOSIS — Z01.818 PREOP GENERAL PHYSICAL EXAM: Primary | ICD-10-CM

## 2018-09-14 DIAGNOSIS — F32.5 MAJOR DEPRESSION IN COMPLETE REMISSION (H): ICD-10-CM

## 2018-09-14 PROCEDURE — 99213 OFFICE O/P EST LOW 20 MIN: CPT | Performed by: FAMILY MEDICINE

## 2018-09-14 ASSESSMENT — ANXIETY QUESTIONNAIRES
6. BECOMING EASILY ANNOYED OR IRRITABLE: NOT AT ALL
7. FEELING AFRAID AS IF SOMETHING AWFUL MIGHT HAPPEN: NOT AT ALL
GAD7 TOTAL SCORE: 0
1. FEELING NERVOUS, ANXIOUS, OR ON EDGE: NOT AT ALL
2. NOT BEING ABLE TO STOP OR CONTROL WORRYING: NOT AT ALL
4. TROUBLE RELAXING: NOT AT ALL
7. FEELING AFRAID AS IF SOMETHING AWFUL MIGHT HAPPEN: NOT AT ALL
3. WORRYING TOO MUCH ABOUT DIFFERENT THINGS: NOT AT ALL
GAD7 TOTAL SCORE: 0
5. BEING SO RESTLESS THAT IT IS HARD TO SIT STILL: NOT AT ALL
GAD7 TOTAL SCORE: 0

## 2018-09-14 ASSESSMENT — PATIENT HEALTH QUESTIONNAIRE - PHQ9
SUM OF ALL RESPONSES TO PHQ QUESTIONS 1-9: 0
SUM OF ALL RESPONSES TO PHQ QUESTIONS 1-9: 0

## 2018-09-14 NOTE — TELEPHONE ENCOUNTER
Called and spoke with patient regarding message below.  Patient verbalized understanding.  Pre-op Faxed.  Aleena Tobar CMA (AAMA)

## 2018-09-14 NOTE — TELEPHONE ENCOUNTER
Reason for Call:  Other pre op    Detailed comments: patient would like to  her pre op paper work today between 2 and 2:30 at  [facility]. Please still fax also    Phone Number Patient can be reached at: Home number on file [phone] (home)    Best Time: any    Can we leave a detailed message on this number? YES    Call taken on 9/14/2018 at 1:43 PM by Em Galvan

## 2018-09-14 NOTE — PROGRESS NOTES
Answers for HPI/ROS submitted by the patient on 9/14/2018   PHQ9 TOTAL SCORE: 0  SAGE 7 TOTAL SCORE: 0

## 2018-09-14 NOTE — MR AVS SNAPSHOT
After Visit Summary   9/14/2018    Bharath Kennedy    MRN: [id number]           Patient Information     Date Of Birth          1950        Visit Information        Provider Department      9/14/2018 1:00 PM Rupinder Quintanilla MD [facility]        Today's Diagnoses     Preop general physical exam    -  1    Major depression in complete remission (H)          Care Instructions      Before Your Surgery      Call your surgeon if there is any change in your health. This includes signs of a cold or flu (such as a sore throat, runny nose, cough, rash or fever).    Do not smoke, drink alcohol or take over the counter medicine (unless your surgeon or primary care doctor tells you to) for the 24 hours before and after surgery.    If you take prescribed drugs: Follow your doctor s orders about which medicines to take and which to stop until after surgery.    Eating and drinking prior to surgery: follow the instructions from your surgeon    Take a shower or bath the night before surgery. Use the soap your surgeon gave you to gently clean your skin. If you do not have soap from your surgeon, use your regular soap. Do not shave or scrub the surgery site.  Wear clean pajamas and have clean sheets on your bed.           Follow-ups after your visit        Who to contact     If you have questions or need follow up information about today's clinic visit or your schedule please contact [facility] directly at [phone].  Normal or non-critical lab and imaging results will be communicated to you by MyChart, letter or phone within 4 business days after the clinic has received the results. If you do not hear from us within 7 days, please contact the clinic through MyChart or phone. If you have a critical or abnormal lab result, we will notify you by phone as soon as possible.  Submit refill requests through [employer] or call your pharmacy and they will forward the refill request to us. Please  "allow 3 business days for your refill to be completed.          Additional Information About Your Visit        MyChart Information     [employer] gives you secure access to your electronic health record. If you see a primary care provider, you can also send messages to your care team and make appointments. If you have questions, please call your primary care clinic.  If you do not have a primary care provider, please call [phone] and they will assist you.        Care EveryWhere ID     This is your Care EveryWhere ID. This could be used by other organizations to access your [facility] medical records  [id number]        Your Vitals Were     Pulse Temperature Height Last Period Pulse Oximetry Breastfeeding?    71 97.4  F (36.3  C) (Temporal) 5' 5\" (1.651 m) 01/01/2005 97% No    BMI (Body Mass Index)                   34.11 kg/m2            Blood Pressure from Last 3 Encounters:   09/14/18 108/70   05/02/18 120/80   04/11/17 122/70    Weight from Last 3 Encounters:   09/14/18 205 lb (93 kg)   05/02/18 207 lb 9.6 oz (94.2 kg)   04/11/17 210 lb (95.3 kg)              Today, you had the following     No orders found for display       Primary Care Provider Office Phone # Fax #    Precious BLANKENSHIP MD [facility] [phone]       [address] [place] [address]        Equal Access to Services     [facility] Center[facility] : Hadii kristyn estrada hadasho Sosalome, waaxda luqadaha, qaybta kaalmada angelito, jason miller . So [facility] [phone].    ATENCIÓN: Si habla español, tiene a walker disposición servicios gratuitos de asistencia lingüística. Rita al [phone].    We comply with applicable federal civil rights laws and [place] laws. We do not discriminate on the basis of race, color, national origin, age, disability, sex, sexual orientation, or gender identity.            Thank you!     Thank you for choosing [facility]  for your care. Our goal is always to provide you with " excellent care. Hearing back from our patients is one way we can continue to improve our services. Please take a few minutes to complete the written survey that you may receive in the mail after your visit with us. Thank you!             Your Updated Medication List - Protect others around you: Learn how to safely use, store and throw away your medicines at www.disposemymeds.org.          This list is accurate as of 9/14/18  2:14 PM.  Always use your most recent med list.                   Brand Name Dispense Instructions for use Diagnosis    butalbital-acetaminophen-caffeine -40 MG per tablet    FIORICET/ESGIC    30 tablet    TAKE ONE TABLET BY MOUTH EVERY 4 HOURS AS NEEDED FOR PAIN    Episodic tension-type headache, not intractable       citalopram 20 MG tablet    celeXA    90 tablet    Take 1 tablet (20 mg) by mouth daily    Major depression in complete remission (H), Anxiety

## 2018-09-15 ASSESSMENT — ASTHMA QUESTIONNAIRES: ACT_TOTALSCORE: 25

## 2018-09-15 ASSESSMENT — ANXIETY QUESTIONNAIRES: GAD7 TOTAL SCORE: 0

## 2018-09-15 ASSESSMENT — PATIENT HEALTH QUESTIONNAIRE - PHQ9: SUM OF ALL RESPONSES TO PHQ QUESTIONS 1-9: 0

## 2018-10-16 ENCOUNTER — TELEPHONE (OUTPATIENT)
Dept: FAMILY MEDICINE | Facility: OTHER | Age: 68
End: 2018-10-16

## 2018-10-16 NOTE — LETTER
[facility]  [address]   [place] [address]  Phone: [phone]  October 16, 2018      Bharath Kennedy  [address]  [place] [address]      Dear Bharath,    We care about your health and have reviewed your health plan including your medical conditions, medications, and lab results.  Based on this review, it is recommended that you follow up regarding the following health topic(s):  -Breast Cancer Screening    We recommend you take the following action(s):  -schedule a MAMMOGRAM which is due. Please disregard this reminder if you have had this exam elsewhere within the last 1-2 years please let us know so we can update your records.     Please call us at the [facility] - [phone] (or use [employer]) to address the above recommendations.     Thank you for trusting [facility] and we appreciate the opportunity to serve you.  We look forward to supporting your healthcare needs in the future.    Healthy Regards,    Your Health Care Team  [facility]

## 2018-10-16 NOTE — TELEPHONE ENCOUNTER
Summary:    Patient is due/failing the following:   MAMMOGRAM    Action needed:   Schedule a mammogram     Type of outreach:    phone no answer or voicemail. Reminder letter sent    Questions for provider review:    None                                                                                                                                    Toya Rizo       Chart routed to Care Team .          Panel Management Review      Patient has the following on her problem list:     Depression / Dysthymia review    Measure:  Needs PHQ-9 score of 4 or less during index window.  Administer PHQ-9 and if score is 5 or more, send encounter to provider for next steps.        PHQ-9 SCORE 5/13/2016 11/11/2016 9/14/2018   Total Score - - -   Total Score MyChart - - 0   Total Score 0 0 0       If PHQ-9 recheck is 5 or more, route to provider for next steps.    Patient is due for:  None    Asthma review     ACT Total Scores 9/14/2018   ACT TOTAL SCORE (Goal Greater than or Equal to 20) 25   In the past 12 months, how many times did you visit the emergency room for your asthma without being admitted to the hospital? 0   In the past 12 months, how many times were you hospitalized overnight because of your asthma? 0      1. Is Asthma diagnosis on the Problem List? Yes    2. Is Asthma listed on Health Maintenance? Yes    3. Patient is due for:  none      Composite cancer screening  Chart review shows that this patient is due/due soon for the following Mammogram

## 2019-02-14 ENCOUNTER — TELEPHONE (OUTPATIENT)
Dept: FAMILY MEDICINE | Facility: OTHER | Age: 69
End: 2019-02-14

## 2019-02-14 NOTE — LETTER
[facility]  [address]   [facility] [place] [address]  Phone: [phone]  February 28, 2019      Bharath Kennedy  [address] [place] [address]      Dear Bharath,    We care about your health and have reviewed your health plan including your medical conditions, medications, and lab results.  Based on this review, it is recommended that you follow up regarding the following health topic(s):  -Breast Cancer Screening  -Colon Cancer Screening    We recommend you take the following action(s):  -schedule a MAMMOGRAM which is due. Please disregard this reminder if you have had this exam elsewhere within the last 1-2 years please let us know so we can update your records.  -schedule a COLONOSCOPY to look for colon cancer (due every 10 years or 5 years in higher risk situations.)  Colonoscopies can prevent 90-95% of colon cancer deaths.  Problem lesions can be removed before they ever become cancer.  If you do not wish to do a colonoscopy or cannot afford to do one at this time, there is another option called a Fecal Immunochemical Occult Blood Test (FIT) a take home stool sample kit.  It does not replace the colonoscopy for colorectal cancer screening, but it can detect hidden bleeding in the lower colon.  It does need to be repeated every year and if a positive result is obtained, you would be referred for a colonoscopy.  If you have completed either one of these tests at another facility, please have the records sent to our clinic for our records.     Please call us at the [facility] - [phone] (or use [employer]) to address the above recommendations.     Thank you for trusting [facility] and we appreciate the opportunity to serve you.  We look forward to supporting your healthcare needs in the future.    Healthy Regards,    Your Health Care Team  [facility]

## 2019-02-14 NOTE — TELEPHONE ENCOUNTER
Summary:    Patient is due/failing the following:   COLONOSCOPY and MAMMOGRAM    Action needed:   Schedule a mammogram and colonoscopy or complete a FIT test     Type of outreach:    Phone, left message for patient to call back.     Questions for provider review:    None                                                                                                                                    Toya Rizo       Chart routed to Care Team .      Panel Management Review      Patient has the following on her problem list:     Depression / Dysthymia review    Measure:  Needs PHQ-9 score of 4 or less during index window.  Administer PHQ-9 and if score is 5 or more, send encounter to provider for next steps.        PHQ-9 SCORE 5/13/2016 11/11/2016 9/14/2018   PHQ-9 Total Score - - -   PHQ-9 Total Score MyChart - - 0   PHQ-9 Total Score 0 0 0       If PHQ-9 recheck is 5 or more, route to provider for next steps.    Patient is due for:  PHQ9    Asthma review     ACT Total Scores 9/14/2018   ACT TOTAL SCORE (Goal Greater than or Equal to 20) 25   In the past 12 months, how many times did you visit the emergency room for your asthma without being admitted to the hospital? 0   In the past 12 months, how many times were you hospitalized overnight because of your asthma? 0      1. Is Asthma diagnosis on the Problem List? Yes    2. Is Asthma listed on Health Maintenance? Yes    3. Patient is due for:  ACT      Composite cancer screening  Chart review shows that this patient is due/due soon for the following Mammogram and Colonoscopy

## 2019-03-29 DIAGNOSIS — F41.9 ANXIETY: ICD-10-CM

## 2019-03-29 DIAGNOSIS — F32.5 MAJOR DEPRESSION IN COMPLETE REMISSION (H): ICD-10-CM

## 2019-03-29 RX ORDER — CITALOPRAM HYDROBROMIDE 20 MG/1
20 TABLET ORAL DAILY
Qty: 30 TABLET | Refills: 0 | Status: SHIPPED | OUTPATIENT
Start: 2019-03-29 | End: 2019-05-03

## 2019-03-29 NOTE — TELEPHONE ENCOUNTER
Reason for Call:  Medication or medication refill:    Do you use a [facility]?  Name of the pharmacy and phone number for the current request:  [facility] - [phone]    Name of the medication requested: Citalopram    Other request: patient has a appt in May but her medication will  in April    Can we leave a detailed message on this number? NO    Phone number patient can be reached at: Cell number on file:    Telephone Information:   Mobile [phone]       Best Time:     Call taken on 3/29/2019 at 1:35 PM by Aubrie Evangelista

## 2019-03-29 NOTE — TELEPHONE ENCOUNTER
Citalopram    PHQ-9 SCORE 5/13/2016 11/11/2016 9/14/2018   PHQ-9 Total Score - - -   PHQ-9 Total Score MyChart - - 0   PHQ-9 Total Score 0 0 0     Medication is being filled for 1 time refill only due to:  Scheduled appt     Next 5 appointments (look out 90 days)    May 03, 2019  7:20 AM CDT  PHYSICAL with Precious Hay MD  [facility] ([facility]) [address]  [facility] [address]  [phone]        Laila Franco, RN, BSN

## 2019-04-23 NOTE — PROGRESS NOTES
"SUBJECTIVE:   Bharath Kennedy is a 68 year old female who presents for Preventive Visit.  Are you in the first 12 months of your Medicare coverage?  No    Healthy Habits:     In general, how would you rate your overall health?  Good    Frequency of exercise:  2-3 days/week    Duration of exercise:  45-60 minutes    Do you usually eat at least 4 servings of fruit and vegetables a day, include whole grains    & fiber and avoid regularly eating high fat or \"junk\" foods?  Yes    Taking medications regularly:  Yes    Medication side effects:  None    Ability to successfully perform activities of daily living:  No assistance needed    Home Safety:  No safety concerns identified    Hearing Impairment:  No hearing concerns    In the past 6 months, have you been bothered by leaking of urine?  No    In general, how would you rate your overall mental or emotional health?  Good      PHQ-2 Total Score: 0    Additional concerns today:  No  Answers for HPI/ROS submitted by the patient on 5/2/2019   Annual Exam:  If you checked off any problems, how difficult have these problems made it for you to do your work, take care of things at home, or get along with other people?: Not difficult at all  PHQ9 TOTAL SCORE: 0  SAGE 7 TOTAL SCORE: 0    Do you feel safe in your environment? Yes    Do you have a Health Care Directive? No: Advance care planning reviewed with patient; information given to patient to review.      Fall risk  Fallen 2 or more times in the past year?: No  Any fall with injury in the past year?: No    Cognitive Screening   1) Repeat 3 items (Leader, Season, Table)    2) Clock draw: NORMAL  3) 3 item recall: Recalls 2 object   Results: NORMAL clock, 1-2 items recalled: COGNITIVE IMPAIRMENT LESS LIKELY    Mini-CogTM Copyright MARY Tuttle. Licensed by the author for use in [facility]; reprinted with permission (tee@.[facility]). All rights reserved.      Do you have sleep apnea, excessive snoring or daytime drowsiness?: " no    Reviewed and updated as needed this visit by clinical staff  Tobacco  Allergies  Meds  Med Hx  Surg Hx  Fam Hx  Soc Hx        Reviewed and updated as needed this visit by Provider        Social History     Tobacco Use     Smoking status: Never Smoker     Smokeless tobacco: Never Used     Tobacco comment: no smokers in household   Substance Use Topics     Alcohol use: No     If you drink alcohol do you typically have >3 drinks per day or >7 drinks per week? No    No flowsheet data found.    Current providers sharing in care for this patient include:   Patient Care Team:  Precious Hay MD as PCP - General  Rupinder Quintanilla MD as Assigned PCP    The following health maintenance items are reviewed in Epic and correct as of today:  Health Maintenance   Topic Date Due     DEPRESSION ACTION PLAN  06/05/1969     ADVANCE DIRECTIVE PLANNING Q5 YRS  06/05/2006     ZOSTER IMMUNIZATION (2 of 3) 05/01/2013     MAMMO SCREEN Q2 YR (SYSTEM ASSIGNED)  03/19/2015     DEXA SCAN SCREENING (SYSTEM ASSIGNED)  06/05/2016     ASTHMA ACTION PLAN Q1 YR  12/14/2017     COLON CANCER SCREEN (SYSTEM ASSIGNED)  12/09/2018     ASTHMA CONTROL TEST Q6 MOS  03/14/2019     PHQ-9 Q6 MONTHS  03/14/2019     MEDICARE ANNUAL WELLNESS VISIT  05/02/2019     FALL RISK ASSESSMENT  05/02/2019     PAP Q3 YR  05/13/2019     INFLUENZA VACCINE (Season Ended) 09/01/2019     DTAP/TDAP/TD IMMUNIZATION (2 - Td) 03/06/2023     LIPID SCREEN Q5 YR FEMALE (SYSTEM ASSIGNED)  05/02/2023     PNEUMOCOCCAL IMMUNIZATION 65+ LOW/MEDIUM RISK  Completed     HEPATITIS C SCREENING  Completed     IPV IMMUNIZATION  Aged Out     MENINGITIS IMMUNIZATION  Aged Out           Review of Systems   Constitutional: Negative for chills and fever.   HENT: Negative for congestion, ear pain, hearing loss and sore throat.    Eyes: Negative for pain and visual disturbance.   Respiratory: Negative for cough and shortness of breath.    Cardiovascular: Negative for chest pain,  "palpitations and peripheral edema.   Gastrointestinal: Negative for abdominal pain, constipation, diarrhea, heartburn, hematochezia and nausea.   Breasts:  Negative for tenderness, breast mass and discharge.   Genitourinary: Negative for dysuria, frequency, genital sores, hematuria, pelvic pain, urgency, vaginal bleeding and vaginal discharge.   Musculoskeletal: Negative for arthralgias, joint swelling and myalgias.   Skin: Negative for rash.   Neurological: Negative for dizziness, weakness, headaches and paresthesias.   Psychiatric/Behavioral: Negative for mood changes. The patient is not nervous/anxious.          OBJECTIVE:   /70 (BP Location: Left arm, Patient Position: Chair, Cuff Size: Adult Large)   Pulse 69   Temp 98.1  F (36.7  C) (Temporal)   Resp 14   Ht 1.645 m (5' 4.76\")   Wt 89.8 kg (198 lb)   LMP 01/01/2005   SpO2 97%   BMI 33.19 kg/m   Estimated body mass index is 33.19 kg/m  as calculated from the following:    Height as of this encounter: 1.645 m (5' 4.76\").    Weight as of this encounter: 89.8 kg (198 lb).  Physical Exam   Constitutional: She is oriented to person, place, and time. She appears well-developed and well-nourished. No distress.   HENT:   Right Ear: Tympanic membrane and external ear normal.   Left Ear: Tympanic membrane and external ear normal.   Nose: Nose normal.   Mouth/Throat: Oropharynx is clear and moist. No oropharyngeal exudate.   Eyes: Pupils are equal, round, and reactive to light. Conjunctivae are normal. Right eye exhibits no discharge. Left eye exhibits no discharge.   Neck: Neck supple. No tracheal deviation present. No thyromegaly present.   Cardiovascular: Normal rate, regular rhythm, S1 normal, S2 normal, normal heart sounds and normal pulses. Exam reveals no S3, no S4 and no friction rub.   No murmur heard.  Pulmonary/Chest: Effort normal and breath sounds normal. No respiratory distress. She has no wheezes. She has no rales.   Abdominal: Soft. Bowel " sounds are normal. She exhibits no mass. There is no hepatosplenomegaly. There is no tenderness.   Musculoskeletal: Normal range of motion. She exhibits no edema.   Lymphadenopathy:     She has no cervical adenopathy.   Neurological: She is alert and oriented to person, place, and time. She has normal strength and normal reflexes. She exhibits normal muscle tone.   Skin: Skin is warm and dry. No rash noted.   Psychiatric: She has a normal mood and affect. Judgment and thought content normal. Cognition and memory are normal.       ASSESSMENT / PLAN:   1. Encounter for routine adult health examination without abnormal findings    - Lipid panel reflex to direct LDL Fasting  - Glucose    2. Episodic tension-type headache, not intractable  Uses Fioricet sparingly and 30 tablets usually lasts a whole year.    - butalbital-acetaminophen-caffeine (FIORICET/ESGIC) -40 MG tablet; TAKE ONE TABLET BY MOUTH EVERY 4 HOURS AS NEEDED FOR PAIN  Dispense: 30 tablet; Refill: 0    3. Major depression in complete remission (H)  She is doing very well.  She is wondering whether the medication is doing anything for her.  We discussed weaning down and titrating her off.  Patient states she has done this in the past and her symptoms have recurred and then it is taken longer to get them controlled and she prefer to just stay on the medication.    - citalopram (CELEXA) 20 MG tablet; Take 1 tablet (20 mg) by mouth daily  Dispense: 90 tablet; Refill: 3    4. Anxiety  As above    - citalopram (CELEXA) 20 MG tablet; Take 1 tablet (20 mg) by mouth daily  Dispense: 90 tablet; Refill: 3    5. Screen for colon cancer  Due for colonoscopy.    - GASTROENTEROLOGY ADULT REF PROCEDURE ONLY [facility] [phone]    6. Asymptomatic postmenopausal status    - DEXA HIP/PELVIS/SPINE - Future; Future    7. Visit for screening mammogram    - MA SCREENING DIGITAL BILAT - Future  (s+30); Future    End of Life Planning:  Patient currently has an  "advanced directive: No.  I have verified the patient's ablity to prepare an advanced directive/make health care decisions.  Literature was provided to assist patient in preparing an advanced directive.    COUNSELING:  Reviewed preventive health counseling, as reflected in patient instructions    BP Readings from Last 1 Encounters:   05/03/19 112/70     Estimated body mass index is 33.19 kg/m  as calculated from the following:    Height as of this encounter: 1.645 m (5' 4.76\").    Weight as of this encounter: 89.8 kg (198 lb).      Weight management plan: Discussed healthy diet and exercise guidelines     reports that she has never smoked. She has never used smokeless tobacco.      Appropriate preventive services were discussed with this patient, including applicable screening as appropriate for cardiovascular disease, diabetes, osteopenia/osteoporosis, and glaucoma.  As appropriate for age/gender, discussed screening for colorectal cancer, prostate cancer, breast cancer, and cervical cancer. Checklist reviewing preventive services available has been given to the patient.    Reviewed patients plan of care and provided an AVS. The Basic Care Plan (routine screening as documented in Health Maintenance) for Bharath meets the Care Plan requirement. This Care Plan has been established and reviewed with the Patient.    Counseling Resources:  ATP IV Guidelines  Pooled Cohorts Equation Calculator  Breast Cancer Risk Calculator  FRAX Risk Assessment  ICSI Preventive Guidelines  Dietary Guidelines for Americans, 2010  USDA's MyPlate  ASA Prophylaxis  Lung CA Screening    Precious Hay MD  [facility]    Identified Health Risks:  "

## 2019-05-02 ASSESSMENT — ANXIETY QUESTIONNAIRES
2. NOT BEING ABLE TO STOP OR CONTROL WORRYING: NOT AT ALL
6. BECOMING EASILY ANNOYED OR IRRITABLE: NOT AT ALL
GAD7 TOTAL SCORE: 0
GAD7 TOTAL SCORE: 0
5. BEING SO RESTLESS THAT IT IS HARD TO SIT STILL: NOT AT ALL
7. FEELING AFRAID AS IF SOMETHING AWFUL MIGHT HAPPEN: NOT AT ALL
3. WORRYING TOO MUCH ABOUT DIFFERENT THINGS: NOT AT ALL
4. TROUBLE RELAXING: NOT AT ALL
1. FEELING NERVOUS, ANXIOUS, OR ON EDGE: NOT AT ALL
GAD7 TOTAL SCORE: 0
7. FEELING AFRAID AS IF SOMETHING AWFUL MIGHT HAPPEN: NOT AT ALL

## 2019-05-02 ASSESSMENT — PATIENT HEALTH QUESTIONNAIRE - PHQ9
SUM OF ALL RESPONSES TO PHQ QUESTIONS 1-9: 0
10. IF YOU CHECKED OFF ANY PROBLEMS, HOW DIFFICULT HAVE THESE PROBLEMS MADE IT FOR YOU TO DO YOUR WORK, TAKE CARE OF THINGS AT HOME, OR GET ALONG WITH OTHER PEOPLE: NOT DIFFICULT AT ALL
SUM OF ALL RESPONSES TO PHQ QUESTIONS 1-9: 0

## 2019-05-02 ASSESSMENT — ENCOUNTER SYMPTOMS
SHORTNESS OF BREATH: 0
WEAKNESS: 0
HEARTBURN: 0
SORE THROAT: 0
HEMATURIA: 0
NAUSEA: 0
ABDOMINAL PAIN: 0
FREQUENCY: 0
DYSURIA: 0
FEVER: 0
DIARRHEA: 0
PARESTHESIAS: 0
ARTHRALGIAS: 0
CONSTIPATION: 0
BREAST MASS: 0
COUGH: 0
EYE PAIN: 0
NERVOUS/ANXIOUS: 0
PALPITATIONS: 0
JOINT SWELLING: 0
MYALGIAS: 0
HEMATOCHEZIA: 0
DIZZINESS: 0
CHILLS: 0
HEADACHES: 0

## 2019-05-02 ASSESSMENT — ACTIVITIES OF DAILY LIVING (ADL): CURRENT_FUNCTION: NO ASSISTANCE NEEDED

## 2019-05-03 ENCOUNTER — OFFICE VISIT (OUTPATIENT)
Dept: FAMILY MEDICINE | Facility: OTHER | Age: 69
End: 2019-05-03
Payer: COMMERCIAL

## 2019-05-03 VITALS
TEMPERATURE: 98.1 F | DIASTOLIC BLOOD PRESSURE: 70 MMHG | OXYGEN SATURATION: 97 % | HEIGHT: 65 IN | WEIGHT: 198 LBS | BODY MASS INDEX: 32.99 KG/M2 | SYSTOLIC BLOOD PRESSURE: 112 MMHG | RESPIRATION RATE: 14 BRPM | HEART RATE: 69 BPM

## 2019-05-03 DIAGNOSIS — Z12.11 SCREEN FOR COLON CANCER: ICD-10-CM

## 2019-05-03 DIAGNOSIS — G44.219 EPISODIC TENSION-TYPE HEADACHE, NOT INTRACTABLE: ICD-10-CM

## 2019-05-03 DIAGNOSIS — Z12.31 VISIT FOR SCREENING MAMMOGRAM: ICD-10-CM

## 2019-05-03 DIAGNOSIS — Z78.0 ASYMPTOMATIC POSTMENOPAUSAL STATUS: ICD-10-CM

## 2019-05-03 DIAGNOSIS — Z00.00 ENCOUNTER FOR ROUTINE ADULT HEALTH EXAMINATION WITHOUT ABNORMAL FINDINGS: Primary | ICD-10-CM

## 2019-05-03 DIAGNOSIS — F32.5 MAJOR DEPRESSION IN COMPLETE REMISSION (H): ICD-10-CM

## 2019-05-03 DIAGNOSIS — E28.39 ESTROGEN DEFICIENCY: ICD-10-CM

## 2019-05-03 DIAGNOSIS — F41.9 ANXIETY: ICD-10-CM

## 2019-05-03 LAB
CHOLEST SERPL-MCNC: 197 MG/DL
GLUCOSE SERPL-MCNC: 103 MG/DL (ref 70–99)
HDLC SERPL-MCNC: 47 MG/DL
LDLC SERPL CALC-MCNC: 132 MG/DL
NONHDLC SERPL-MCNC: 150 MG/DL
TRIGL SERPL-MCNC: 89 MG/DL

## 2019-05-03 PROCEDURE — 82947 ASSAY GLUCOSE BLOOD QUANT: CPT | Performed by: FAMILY MEDICINE

## 2019-05-03 PROCEDURE — G0439 PPPS, SUBSEQ VISIT: HCPCS | Performed by: FAMILY MEDICINE

## 2019-05-03 PROCEDURE — 80061 LIPID PANEL: CPT | Performed by: FAMILY MEDICINE

## 2019-05-03 PROCEDURE — 36415 COLL VENOUS BLD VENIPUNCTURE: CPT | Performed by: FAMILY MEDICINE

## 2019-05-03 RX ORDER — CITALOPRAM HYDROBROMIDE 20 MG/1
20 TABLET ORAL DAILY
Qty: 90 TABLET | Refills: 3 | Status: SHIPPED | OUTPATIENT
Start: 2019-05-03 | End: 2020-05-08

## 2019-05-03 RX ORDER — BUTALBITAL, ACETAMINOPHEN AND CAFFEINE 50; 325; 40 MG/1; MG/1; MG/1
TABLET ORAL
Qty: 30 TABLET | Refills: 0 | Status: SHIPPED | OUTPATIENT
Start: 2019-05-03 | End: 2019-05-10 | Stop reason: ALTCHOICE

## 2019-05-03 ASSESSMENT — ENCOUNTER SYMPTOMS
FEVER: 0
CONSTIPATION: 0
SORE THROAT: 0
ABDOMINAL PAIN: 0
NERVOUS/ANXIOUS: 0
BREAST MASS: 0
HEMATURIA: 0
PARESTHESIAS: 0
HEADACHES: 0
ARTHRALGIAS: 0
DYSURIA: 0
FREQUENCY: 0
HEMATOCHEZIA: 0
JOINT SWELLING: 0
HEARTBURN: 0
SHORTNESS OF BREATH: 0
DIARRHEA: 0
NAUSEA: 0
WEAKNESS: 0
PALPITATIONS: 0
DIZZINESS: 0
COUGH: 0
EYE PAIN: 0
MYALGIAS: 0
CHILLS: 0

## 2019-05-03 ASSESSMENT — ACTIVITIES OF DAILY LIVING (ADL): CURRENT_FUNCTION: NO ASSISTANCE NEEDED

## 2019-05-03 ASSESSMENT — PATIENT HEALTH QUESTIONNAIRE - PHQ9: SUM OF ALL RESPONSES TO PHQ QUESTIONS 1-9: 0

## 2019-05-03 ASSESSMENT — ASTHMA QUESTIONNAIRES
QUESTION_1 LAST FOUR WEEKS HOW MUCH OF THE TIME DID YOUR ASTHMA KEEP YOU FROM GETTING AS MUCH DONE AT WORK, SCHOOL OR AT HOME: NONE OF THE TIME
QUESTION_2 LAST FOUR WEEKS HOW OFTEN HAVE YOU HAD SHORTNESS OF BREATH: NOT AT ALL
QUESTION_5 LAST FOUR WEEKS HOW WOULD YOU RATE YOUR ASTHMA CONTROL: COMPLETELY CONTROLLED
QUESTION_3 LAST FOUR WEEKS HOW OFTEN DID YOUR ASTHMA SYMPTOMS (WHEEZING, COUGHING, SHORTNESS OF BREATH, CHEST TIGHTNESS OR PAIN) WAKE YOU UP AT NIGHT OR EARLIER THAN USUAL IN THE MORNING: NOT AT ALL
QUESTION_4 LAST FOUR WEEKS HOW OFTEN HAVE YOU USED YOUR RESCUE INHALER OR NEBULIZER MEDICATION (SUCH AS ALBUTEROL): NOT AT ALL
ACT_TOTALSCORE: 25

## 2019-05-03 ASSESSMENT — MIFFLIN-ST. JEOR: SCORE: 1430.25

## 2019-05-03 ASSESSMENT — ANXIETY QUESTIONNAIRES: GAD7 TOTAL SCORE: 0

## 2019-05-03 NOTE — PATIENT INSTRUCTIONS
Patient Education   Personalized Prevention Plan  You are due for the preventive services outlined below.  Your care team is available to assist you in scheduling these services.  If you have already completed any of these items, please share that information with your care team to update in your medical record.  Health Maintenance Due   Topic Date Due     Depression Action Plan Review  06/05/1969     Discuss Advance Directive Planning  06/05/2006     Zoster (Shingles) Vaccine (2 of 3) 05/01/2013     Mammogram - every 2 years  03/19/2015     Bone Density Screening (Dexa)  06/05/2016     Asthma Action Plan - yearly  12/14/2017     Colon Cancer Screening - every 10 years.  12/09/2018     Asthma Control Test - every 6 months  03/14/2019     Depression Assessment - every 6 months  03/14/2019     Annual Wellness Visit  05/02/2019     FALL RISK ASSESSMENT  05/02/2019     Pap Smear - every 3 years  05/13/2019

## 2019-05-04 ASSESSMENT — ASTHMA QUESTIONNAIRES: ACT_TOTALSCORE: 25

## 2019-05-07 ENCOUNTER — TELEPHONE (OUTPATIENT)
Dept: FAMILY MEDICINE | Facility: OTHER | Age: 69
End: 2019-05-07

## 2019-05-07 DIAGNOSIS — R51.9 NONINTRACTABLE EPISODIC HEADACHE, UNSPECIFIED HEADACHE TYPE: Primary | ICD-10-CM

## 2019-05-07 NOTE — TELEPHONE ENCOUNTER
TC patient. Recommend await TC as this is for episodic tension headaches (not migraines).    Pipe Vigil PA-C  [facility]

## 2019-05-07 NOTE — TELEPHONE ENCOUNTER
Will forward to covering providers to see if they can send something else in... Pharmacy doesn't know what would be covered until rx sent to them and they run it through her insurance.

## 2019-05-07 NOTE — TELEPHONE ENCOUNTER
Reason for Call:  Medication or medication refill:    Do you use a [facility]?  Name of the pharmacy and phone number for the current request:  [facility] - [phone]    Name of the medication requested: migraine medication    Other request: patients Rx for migraine medication was not covered by insurance and was 50 dollars. She is wondering if there is an alternative    Can we leave a detailed message on this number? YES    Phone number patient can be reached at: Home number on file [phone] (home)    Best Time: any    Call taken on 5/7/2019 at 9:12 AM by Em Galvan

## 2019-05-08 ENCOUNTER — ANCILLARY PROCEDURE (OUTPATIENT)
Dept: MAMMOGRAPHY | Facility: CLINIC | Age: 69
End: 2019-05-08
Attending: FAMILY MEDICINE
Payer: COMMERCIAL

## 2019-05-08 ENCOUNTER — ANCILLARY PROCEDURE (OUTPATIENT)
Dept: BONE DENSITY | Facility: CLINIC | Age: 69
End: 2019-05-08
Attending: FAMILY MEDICINE
Payer: COMMERCIAL

## 2019-05-08 DIAGNOSIS — M89.9 DISORDER OF BONE: ICD-10-CM

## 2019-05-08 DIAGNOSIS — Z12.31 VISIT FOR SCREENING MAMMOGRAM: ICD-10-CM

## 2019-05-08 DIAGNOSIS — E28.39 ESTROGEN DEFICIENCY: ICD-10-CM

## 2019-05-08 DIAGNOSIS — Z78.0 ASYMPTOMATIC POSTMENOPAUSAL STATUS: ICD-10-CM

## 2019-05-08 PROCEDURE — 77081 DXA BONE DENSITY APPENDICULR: CPT | Performed by: RADIOLOGY

## 2019-05-08 PROCEDURE — 77067 SCR MAMMO BI INCL CAD: CPT

## 2019-05-08 PROCEDURE — 77080 DXA BONE DENSITY AXIAL: CPT | Mod: 59 | Performed by: RADIOLOGY

## 2019-05-09 NOTE — RESULT ENCOUNTER NOTE
Hello Alesa    Your results were normal.     The results are attached for your review.       Pipe Vigil PA-C

## 2019-05-10 RX ORDER — SUMATRIPTAN 25 MG/1
25 TABLET, FILM COATED ORAL
Qty: 6 TABLET | Refills: 3 | Status: SHIPPED | OUTPATIENT
Start: 2019-05-10 | End: 2020-05-08

## 2019-05-10 NOTE — TELEPHONE ENCOUNTER
Patient called clinic to let TC know that pharmacist will be faxing over a rx for another Migraine/headache medication that would be covered by insurance.

## 2020-05-07 ENCOUNTER — MYC MEDICAL ADVICE (OUTPATIENT)
Dept: FAMILY MEDICINE | Facility: OTHER | Age: 70
End: 2020-05-07

## 2020-05-07 NOTE — PROGRESS NOTES
"SUBJECTIVE:   Bharath Kennedy is a 69 year old female who presents for Preventive Visit.  {PVP to remind patient that this is not necessarily a physical exam; physical exam may or may not be done:584595::\"click delete button to remove this line now\"}  {PVP to inform patient that additional E&M charge may apply, if additional problems addressed:391703::\"click delete button to remove this line now\"}  Are you in the first 12 months of your Medicare coverage?  { :181000::\"No\"}    HPI  Do you feel safe in your environment? { :181080}    Have you ever done Advance Care Planning? (For example, a Health Directive, POLST, or a discussion with a medical provider or your loved ones about your wishes): { :533504}    {Hearing Test Done (Optional):550461}  Fall risk  { :985632}  {If any of the above assessments are answered yes, consider ordering appropriate referrals (Optional):544180::\"click delete button to remove this line now\"}  Cognitive Screening { :390974}    {Do you have sleep apnea, excessive snoring or daytime drowsiness? (Optional):732447}    Reviewed and updated as needed this visit by clinical staff         Reviewed and updated as needed this visit by Provider        Social History     Tobacco Use     Smoking status: Never Smoker     Smokeless tobacco: Never Used     Tobacco comment: no smokers in household   Substance Use Topics     Alcohol use: No     {Rooming Staff- Complete this question if Prescreen response is not shown below for today's visit. If you drink alcohol do you typically have >3 drinks per day or >7 drinks per week? (Optional):508138}    Alcohol Use 5/3/2019   Prescreen: >3 drinks/day or >7 drinks/week? -   Prescreen: >3 drinks/day or >7 drinks/week? { AUDIT responses all:TXT,61617}   {add AUDIT responses (Optional) (A score of 7 for adult men is an indication of hazardous drinking; a score of 8 or more is an indication of an alcohol use disorder.  A score of 7 or more for adult women is an indication " "of hazardous drinking or an alchohol use disorder):089250}    {Outside tests to abstract? :780742}    {additional problems to add (Optional):761044}    Current providers sharing in care for this patient include: {Rooming staff:  Please update Care Team in Rooming Activity, refresh this note and then delete this statement}  Patient Care Team:  Precious Hay MD as PCP - General  Precious Hay MD as Assigned PCP    The following health maintenance items are reviewed in Epic and correct as of today:  Health Maintenance   Topic Date Due     ADVANCE CARE PLANNING  1950     ZOSTER IMMUNIZATION (2 of 3) 2013     ASTHMA ACTION PLAN  2017     COLORECTAL CANCER SCREENING  2018     ASTHMA CONTROL TEST  2019     FALL RISK ASSESSMENT  2020     MEDICARE ANNUAL WELLNESS VISIT  2020     PHQ-9  2020     INFLUENZA VACCINE (Season Ended) 2020     MAMMO SCREENING  2021     DTAP/TDAP/TD IMMUNIZATION (2 - Td) 2023     LIPID  2024     DEXA  Completed     HEPATITIS C SCREENING  Completed     DEPRESSION ACTION PLAN  Completed     PNEUMOCOCCAL IMMUNIZATION 65+ LOW/MEDIUM RISK  Completed     IPV IMMUNIZATION  Aged Out     MENINGITIS IMMUNIZATION  Aged Out     {Chronicprobdata (optional):248279}  {Decision Support (Optional):269920}    Review of Systems  {ROS COMP (Optional):060791}    OBJECTIVE:   LMP 2005  Estimated body mass index is 33.19 kg/m  as calculated from the following:    Height as of 5/3/19: 1.645 m (5' 4.76\").    Weight as of 5/3/19: 89.8 kg (198 lb).  Physical Exam  {Exam (Optional) :400933}    {Diagnostic Test Results (Optional):445713::\"Diagnostic Test Results:\",\"Labs reviewed in Epic\"}    ASSESSMENT / PLAN:   {Dia Picklist:504545}    COUNSELING:  {Medicare Counselin}    Estimated body mass index is 33.19 kg/m  as calculated from the following:    Height as of 5/3/19: 1.645 m (5' 4.76\").    Weight as of 5/3/19: 89.8 kg (198 " lb).    {Weight Management Plan (ACO) Complete if BMI is abnormal-  Ages 18-64  BMI >24.9.  Age 65+ with BMI <23 or >30 (Optional):723076}     reports that she has never smoked. She has never used smokeless tobacco.  {Tobacco Cessation -- Complete if patient is a smoker (Optional):506662}    Appropriate preventive services were discussed with this patient, including applicable screening as appropriate for cardiovascular disease, diabetes, osteopenia/osteoporosis, and glaucoma.  As appropriate for age/gender, discussed screening for colorectal cancer, prostate cancer, breast cancer, and cervical cancer. Checklist reviewing preventive services available has been given to the patient.    Reviewed patients plan of care and provided an AVS. The {CarePlan:485421} for Bharath meets the Care Plan requirement. This Care Plan has been established and reviewed with the {PATIENT, FAMILY MEMBER, CAREGIVER:217368}.    Counseling Resources:  ATP IV Guidelines  Pooled Cohorts Equation Calculator  Breast Cancer Risk Calculator  FRAX Risk Assessment  ICSI Preventive Guidelines  Dietary Guidelines for Americans, 2010  USDA's MyPlate  ASA Prophylaxis  Lung CA Screening    Precious Hay MD  [facility]    Identified Health Risks:

## 2020-05-07 NOTE — TELEPHONE ENCOUNTER
Notes from provider for appt on 5/8/2020:   Change to video visit--if unable to do video, could do tele visit for med check.     Priyanka Plascencia CMA (AAMA)

## 2020-05-07 NOTE — PATIENT INSTRUCTIONS
Patient Education   Personalized Prevention Plan  You are due for the preventive services outlined below.  Your care team is available to assist you in scheduling these services.  If you have already completed any of these items, please share that information with your care team to update in your medical record.  Health Maintenance Due   Topic Date Due     Discuss Advance Care Planning  1950     Zoster (Shingles) Vaccine (2 of 3) 05/01/2013     Asthma Action Plan - yearly  12/14/2017     Colorectal Cancer Screening  12/09/2018     Asthma Control Test  11/03/2019     FALL RISK ASSESSMENT  05/03/2020     Annual Wellness Visit  05/03/2020     Depression Assessment  05/03/2020

## 2020-05-08 ENCOUNTER — VIRTUAL VISIT (OUTPATIENT)
Dept: FAMILY MEDICINE | Facility: OTHER | Age: 70
End: 2020-05-08
Payer: COMMERCIAL

## 2020-05-08 DIAGNOSIS — F32.5 MAJOR DEPRESSION IN COMPLETE REMISSION (H): Primary | ICD-10-CM

## 2020-05-08 DIAGNOSIS — Z12.11 SPECIAL SCREENING FOR MALIGNANT NEOPLASMS, COLON: ICD-10-CM

## 2020-05-08 DIAGNOSIS — F41.9 ANXIETY: ICD-10-CM

## 2020-05-08 DIAGNOSIS — R51.9 NONINTRACTABLE EPISODIC HEADACHE, UNSPECIFIED HEADACHE TYPE: ICD-10-CM

## 2020-05-08 PROCEDURE — 99214 OFFICE O/P EST MOD 30 MIN: CPT | Mod: 95 | Performed by: FAMILY MEDICINE

## 2020-05-08 RX ORDER — SUMATRIPTAN 25 MG/1
25 TABLET, FILM COATED ORAL
Qty: 12 TABLET | Refills: 3 | Status: SHIPPED | OUTPATIENT
Start: 2020-05-08 | End: 2021-04-27

## 2020-05-08 RX ORDER — CITALOPRAM HYDROBROMIDE 20 MG/1
20 TABLET ORAL DAILY
Qty: 90 TABLET | Refills: 3 | Status: SHIPPED | OUTPATIENT
Start: 2020-05-08 | End: 2021-04-27

## 2020-05-08 ASSESSMENT — ANXIETY QUESTIONNAIRES
5. BEING SO RESTLESS THAT IT IS HARD TO SIT STILL: NOT AT ALL
IF YOU CHECKED OFF ANY PROBLEMS ON THIS QUESTIONNAIRE, HOW DIFFICULT HAVE THESE PROBLEMS MADE IT FOR YOU TO DO YOUR WORK, TAKE CARE OF THINGS AT HOME, OR GET ALONG WITH OTHER PEOPLE: NOT DIFFICULT AT ALL
7. FEELING AFRAID AS IF SOMETHING AWFUL MIGHT HAPPEN: NOT AT ALL
6. BECOMING EASILY ANNOYED OR IRRITABLE: NOT AT ALL
GAD7 TOTAL SCORE: 0
2. NOT BEING ABLE TO STOP OR CONTROL WORRYING: NOT AT ALL
1. FEELING NERVOUS, ANXIOUS, OR ON EDGE: NOT AT ALL
3. WORRYING TOO MUCH ABOUT DIFFERENT THINGS: NOT AT ALL

## 2020-05-08 ASSESSMENT — PATIENT HEALTH QUESTIONNAIRE - PHQ9
SUM OF ALL RESPONSES TO PHQ QUESTIONS 1-9: 0
5. POOR APPETITE OR OVEREATING: NOT AT ALL

## 2020-05-08 NOTE — PROGRESS NOTES
"Bharath Kennedy is a 69 year old female who is being evaluated via a billable telephone visit.      The patient has been notified of following:     \"This telephone visit will be conducted via a call between you and your physician/provider. We have found that certain health care needs can be provided without the need for a physical exam.  This service lets us provide the care you need with a short phone conversation.  If a prescription is necessary we can send it directly to your pharmacy.  If lab work is needed we can place an order for that and you can then stop by our lab to have the test done at a later time.    Telephone visits are billed at different rates depending on your insurance coverage. During this emergency period, for some insurers they may be billed the same as an in-person visit.  Please reach out to your insurance provider with any questions.    If during the course of the call the physician/provider feels a telephone visit is not appropriate, you will not be charged for this service.\"    Patient has given verbal consent for Telephone visit?  Yes    What phone number would you like to be contacted at? [phone]    How would you like to obtain your AVS? Jeanette Mazariegos     Bharath Kennedy is a 69 year old female who presents to clinic today for the following health issues:    HPI  Depression and Anxiety Follow-Up    How are you doing with your depression since your last visit? No change    How are you doing with your anxiety since your last visit?  No change    Are you having other symptoms that might be associated with depression or anxiety? No    Have you had a significant life event? No     Do you have any concerns with your use of alcohol or other drugs? No    Social History     Tobacco Use     Smoking status: Never Smoker     Smokeless tobacco: Never Used     Tobacco comment: no smokers in household   Substance Use Topics     Alcohol use: No     Drug use: No     PHQ 9/14/2018 5/2/2019 " 5/8/2020   PHQ-9 Total Score 0 0 0   Q9: Thoughts of better off dead/self-harm past 2 weeks Not at all Not at all Not at all     SAGE-7 SCORE 9/14/2018 5/2/2019 5/8/2020   Total Score - - -   Total Score 0 (minimal anxiety) 0 (minimal anxiety) -   Total Score 0 0 0     Last PHQ-9 5/8/2020   1.  Little interest or pleasure in doing things 0   2.  Feeling down, depressed, or hopeless 0   3.  Trouble falling or staying asleep, or sleeping too much 0   4.  Feeling tired or having little energy 0   5.  Poor appetite or overeating 0   6.  Feeling bad about yourself 0   7.  Trouble concentrating 0   8.  Moving slowly or restless 0   Q9: Thoughts of better off dead/self-harm past 2 weeks 0   PHQ-9 Total Score 0   Difficulty at work, home, or with people Not difficult at all     SAGE-7  5/8/2020   1. Feeling nervous, anxious, or on edge 0   2. Not being able to stop or control worrying 0   3. Worrying too much about different things 0   4. Trouble relaxing 0   5. Being so restless that it is hard to sit still 0   6. Becoming easily annoyed or irritable 0   7. Feeling afraid, as if something awful might happen 0   SAGE-7 Total Score 0   If you checked any problems, how difficult have they made it for you to do your work, take care of things at home, or get along with other people? Not difficult at all         Suicide Assessment Five-step Evaluation and Treatment (SAFE-T)    Migraine     Since your last clinic visit, how have your headaches changed?  No change    How often are you getting headaches or migraines? Once a month     Are you able to do normal daily activities when you have a migraine? Yes With medication    Are you taking rescue/relief medications? (Select all that apply) sumatriptan (Imitrex)    How helpful is your rescue/relief medication?  I get total relief    Are you taking any medications to prevent migraines? (Select all that apply)  No    In the past 4 weeks, how often have you gone to urgent care or the  "emergency room because of your headaches?  0      How many servings of fruits and vegetables do you eat daily?  2-3    On average, how many sweetened beverages do you drink each day (Examples: soda, juice, sweet tea, etc.  Do NOT count diet or artificially sweetened beverages)?   0    How many days per week do you exercise enough to make your heart beat faster? 4    How many minutes a day do you exercise enough to make your heart beat faster? 30 - 60    How many days per week do you miss taking your medication? 0    Doesn't get migraines very often, Imitrex is very helpful.       Patient Active Problem List   Diagnosis     Anxiety     Major depression in complete remission (H)     Environmental allergies     Moderate persistent asthma without complication     Allergic rhinitis due to dust mite     Allergic rhinitis due to cat hair     Seasonal allergic rhinitis due to pollen     Past Surgical History:   Procedure Laterality Date     COLONOSCOPY  2008    [facility]     HC TOOTH EXTRACTION W/FORCEP       LAMINECT/DISCECTOMY, LUMBAR  06/26/08    Left L5-S1 discectomy for herniated disc & left leg pain.     TONSILLECTOMY         Social History     Tobacco Use     Smoking status: Never Smoker     Smokeless tobacco: Never Used     Tobacco comment: no smokers in household   Substance Use Topics     Alcohol use: No     Family History   Problem Relation Age of Onset     Cancer Mother         breast     C.A.D. Mother         MI in 80s, also had \"hole in ventricule\"     Diabetes Sister         1/2 sister     Depression Sister         2 sisters     Diabetes Sister            Reviewed and updated as needed this visit by Provider  Tobacco  Allergies  Meds  Problems  Med Hx  Surg Hx  Fam Hx         Review of Systems   CONSTITUTIONAL: NEGATIVE for fever, chills, change in weight  ENT/MOUTH: NEGATIVE for ear, mouth and throat problems  RESP: NEGATIVE for significant cough or shortness of breath   CV: NEGATIVE for chest pain, " palpitations or peripheral edema       Objective   Reported vitals:  [facility] 01/01/2005    Gen:  alert and no distress  PSYCH: Alert and oriented times 3; coherent speech, normal   rate and volume, able to articulate logical thoughts, able   to abstract reason, no tangential thoughts, no hallucinations   or delusions  Her affect is normal  RESP: No cough, no audible wheezing, able to talk in full sentences  Remainder of exam unable to be completed due to telephone visits        Assessment/Plan:  1. Major depression in complete remission (H)  Doing very well.  In the midst of this pandemic does not want to consider dose decrease.    - citalopram (CELEXA) 20 MG tablet; Take 1 tablet (20 mg) by mouth daily  Dispense: 90 tablet; Refill: 3    2. Anxiety  Well-controlled.  Refills are given.    - citalopram (CELEXA) 20 MG tablet; Take 1 tablet (20 mg) by mouth daily  Dispense: 90 tablet; Refill: 3    3. Nonintractable episodic headache, unspecified headache type  Imitrex is helpful when she has a migraine which happens about 6-12 times a year.  She has not found any triggers.    - SUMAtriptan (IMITREX) 25 MG tablet; Take 1 tablet (25 mg) by mouth at onset of headache for migraine  Dispense: 12 tablet; Refill: 3    4. Special screening for malignant neoplasms, colon  She is overdue for colonoscopy.  We discussed the testing instead and she is agreeable.    - Fecal colorectal cancer screen (FIT); Future    Return in about 53 weeks (around 5/14/2021) for Annual Wellness Visit.      Phone call duration:  6 minutes    Precious Hay MD

## 2020-05-09 ASSESSMENT — ANXIETY QUESTIONNAIRES: GAD7 TOTAL SCORE: 0

## 2020-05-09 ASSESSMENT — ASTHMA QUESTIONNAIRES: ACT_TOTALSCORE: 25

## 2020-05-19 DIAGNOSIS — Z12.11 SPECIAL SCREENING FOR MALIGNANT NEOPLASMS, COLON: ICD-10-CM

## 2020-05-19 PROCEDURE — 82274 ASSAY TEST FOR BLOOD FECAL: CPT | Performed by: FAMILY MEDICINE

## 2020-05-26 LAB — HEMOCCULT STL QL IA: NEGATIVE

## 2020-05-27 NOTE — RESULT ENCOUNTER NOTE
Lito Ale    Your results were normal/negative.    The results are attached for your review.       Pipe Vigil PA-C

## 2020-10-05 NOTE — TELEPHONE ENCOUNTER
MEDICARE WELLNESS VISIT NOTE      HPI:   Norma Bee presents for her Initial Medicare Wellness Visit.   She has complaints or concerns which include diabetes testing, flu vaccine. She said she plans to address these issues today.     Medication verified by MA    CHOLESTEROL (mg/dL)   Date Value   10/04/2019 170     HDL (mg/dL)   Date Value   10/04/2019 52     No components found for: CHOLHDLRATIO  TRIGLYCERIDE (mg/dL)   Date Value   10/04/2019 104     CALCULATED LDL (mg/dL)   Date Value   10/04/2019 97     Glucose (mg/dL)   Date Value   10/04/2019 91       Health Maintenance   Topic Date Due   • Medicare Wellness Visit  07/01/2020   • Influenza Vaccine (1) 09/01/2020   • Diabetes A1C  09/17/2020   • DM/CKD GFR  10/04/2020   • Diabetes Eye Exam  10/11/2020   • DTaP/Tdap/Td Vaccine (2 - Td) 01/20/2021   • Diabetes Foot Exam  03/17/2021   • Breast Cancer Screening  06/03/2021   • Colorectal Cancer Screening-Colonoscopy  12/13/2026   • Hepatitis B Vaccine  Completed   • Shingles Vaccine  Completed   • Pneumococcal Vaccine 0-64  Completed   • Meningococcal Vaccine  Aged Out   • HPV Vaccine  Aged Out       Patient Care Team:  Mary Ann Rose MD as PCP - General (Family Practice)  Norma Coronado MD as Pulmonary Medicine (Internal Medicine - Pulmonary Disease)  Marlyn David DPM as Podiatry (Podiatry - Foot & Ankle Surgery)  SAVANNAH Ochoa as Audiologist (Audiology)  Koki Bustillos, PHD (Psychology)  Lisa K Muchard, MD (Dermatology)  Charmaine Donald MD (Psychiatry)  SAVANNAH Nelson (Audiology)  Casey Swain MD (ENT/Otolaryngology)  Dia Tolbert OT as Occupational Therapist (Occupational Therapy)        Patient Active Problem List    Diagnosis Date Noted   • Major depressive disorder, recurrent episode, in full remission (CMS/[facility]) 08/15/2019     Priority: Low   • Other specified anxiety disorders 08/15/2019     Priority: Low   • Insomnia due to mental disorder 08/15/2019     OV not completed yet, as provider is seeing patients. Please review/advise.  Soumya Beltre, CMA        Priority: Low   • Type 2 diabetes mellitus without complication, without long-term current use of insulin (CMS/HCC) 04/14/2019     Priority: Low   • Bilateral lower leg cellulitis 12/21/2017     Priority: Low   • Edema of both legs 12/21/2017     Priority: Low   • Female hirsutism 12/21/2017     Priority: Low   • Genital herpes      Priority: Low   • Asthma 04/16/2015     Priority: Low   • Varicose veins of lower extremities 04/16/2015     Priority: Low   • Plantar fasciitis, bilateral 04/16/2015     Priority: Low     followed by podiatrist Marlyn David DPM     • Class 3 severe obesity with body mass index (BMI) of 50.0 to 59.9 in adult (CMS/[facility]) 04/16/2015     Priority: Low   • Lower extremity venous stasis 04/16/2015     Priority: Low   • ELLE (obstructive sleep apnea)      Priority: Low   • Posterior tibial tendon dysfunction, Left 03/17/2015     Priority: Low     followed by podiatrist Marlyn David DPM; considering surgery to reconstruct her left foot; has been wearing cam boot walker on LLE since January 2015     • Gastrocnemius equinus of left lower extremity 03/17/2015     Priority: Low     followed by podiatrist Marlyn David DPM; considering surgery to reconstruct her left foot; has been wearing cam boot walker on LLE since January 2015       • Hematuria, microscopic 12/28/2014     Priority: Low     Seen on U/A from 12/10/14. Repeat at next visit.      • Benign essential HTN 11/18/2014     Priority: Low     ELLE, obesity contributing. Restarted cozaar 25mg, carvedilol 6.25mg BID. Patient working on acquiring CPAP; pulm following.     • Pes planus of left foot 11/18/2014     Priority: Low     followed by podiatrist Marlyn David DPM; considering surgery to reconstruct her left foot; has been wearing cam boot walker on LLE since January 2015             Past Medical History:   Diagnosis Date   • Arthritis    • Asthma, mild intermittent, well-controlled    • Bronchitis    • Essential  (primary) hypertension    • Gastrocnemius equinus of left lower extremity 3/17/2015    followed by podiatrist Marlyn David DPM    • Genital herpes    • Lower extremity venous stasis 4/16/2015   • Major depressive disorder, recurrent episode, in full remission (CMS/[facility]) 8/15/2019   • ELLE (obstructive sleep apnea)    • Other specified anxiety disorders 8/15/2019   • Pes planus of left foot 11/18/2014    followed by podiatrist Marlyn David DPM    • Plantar fasciitis, bilateral 4/16/2015    followed by podiatrist Marlyn David DPM    • Posterior tibial tendon dysfunction, Left 3/17/2015    followed by podiatrist Marlyn David DPM; considering surgery to reconstruct her left foot; has been wearing cam boot walker on LLE since January 2015    • Type 2 diabetes mellitus without complication, without long-term current use of insulin (CMS/[facility]) 4/14/2019   • Varicose veins of lower extremities 4/16/2015         Past Surgical History:   Procedure Laterality Date   • Abdominal hernia repair     • Colonoscopy diagnostic  12/13/2016    Affi 10yr recall   • Foot arthrodesis      Foot surgery left foot with screws, tendon repair & arch buil up   • Hernia repair     • Hysterectomy           Social History     Tobacco Use   • Smoking status: Never Smoker   • Smokeless tobacco: Never Used   Substance Use Topics   • Alcohol use: No     Alcohol/week: 0.0 standard drinks     Frequency: Never     Drinks per session: 1 or 2     Binge frequency: Never   • Drug use: No     Social History     Substance and Sexual Activity   Drug Use No     Family History   Problem Relation Age of Onset   • Heart disease Sister         Pacemaker    • Cancer Sister    • Heart disease Brother         Hole in Heart   • Diabetes Brother    • Hypertension Brother    • Heart disease Maternal Aunt 80   • Rheumatoid Arthritis Mother    • Cancer Mother    • Asthma Son    • Scoliosis Brother         Current Outpatient Medications   Medication  Sig Dispense Refill   • traMADol (ULTRAM) 50 MG tablet Take 1 tablet by mouth 2 times daily as needed for Pain. 30 tablet 0   • azelaic acid (AZELEX) 20 % cream Apply topically 2 times daily. 30 g 11   • montelukast (SINGULAIR) 10 MG tablet TAKE 1 TABLET BY MOUTH EVERY NIGHT 90 tablet 2   • ONETOUCH VERIO test strip USE TO TEST BLOOD SUGAR ONCE DAILY AS DIRECTED 50 strip 3   • spironolactone (ALDACTONE) 50 MG tablet Take 1 tablet by mouth 2 times daily. 180 tablet 3   • ALPRAZolam (XANAX) 0.25 MG tablet TAKE 1 TABLET BY MOUTH UP TO TWICE DAILY FOR ANXIETY 60 tablet 5   • losartan (COZAAR) 50 MG tablet Take 1 tablet by mouth daily. 90 tablet 3   • metFORMIN (GLUCOPHAGE-XR) 500 MG 24 hr tablet Take 4 tablets by mouth daily (with breakfast). 360 tablet 3   • polyethylene glycol (MIRALAX) powder Take 17 g by mouth daily. (Patient taking differently: Take 17 g by mouth as needed. ) 255 g 0   • clotrimazole-betamethasone (LOTRISONE) 1-0.05 % cream Apply 1 application topically 2 times daily. For 2 weeks 45 g 1   • fluticasone-salmeterol (ADVAIR DISKUS) 250-50 MCG/DOSE inhaler Inhale 1 puff into the lungs two times daily. 60 each 3   • valACYclovir (VALTREX) 500 MG tablet Take 1 tablet by mouth 2 times daily. For 3 days 6 tablet 5   • vilazodone (VIIBRYD) 40 MG tablet Take 1 tablet by mouth daily. (Patient taking differently: Take 40 mg by mouth at bedtime. ) 90 tablet 3   • Incontinence Supply Disposable (ALWAYS DISCREET) Pads 1 each 4 times daily. Size 6. 28 each 11   • atorvastatin (LIPITOR) 20 MG tablet Take 1 tablet by mouth daily. 90 tablet 3   • fluticasone (FLONASE) 50 MCG/ACT nasal spray SHAKE LIQUID AND USE 1 SPRAY IN EACH NOSTRIL DAILY 16 mL 5   • ibuprofen (MOTRIN) 800 MG tablet Take 1 tablet by mouth 3 times daily as needed for Pain. 15 tablet 0   • ONETOUCH DELICA LANCETS 33G Misc To test once a day 100 each 12   • azelaic acid (AZELEX) 20 % cream Apply topically 2 times daily. (Patient taking differently:  Apply topically as needed. ) 50 g 5   • triamcinolone (ARISTOCORT) 0.1 % cream Apply to affected area twice daily for 2 weeks. 30 g 0   • furosemide (LASIX) 40 MG tablet Take 1 tablet by mouth daily as needed (edema). 90 tablet 3   • guaiFENesin-codeine (VIRTUSSIN A/C) 100-10 MG/5ML liquid Take 5 mLs by mouth 3 times daily as needed for Cough. 90 mL 0   • Turmeric 500 MG Tab Take by mouth daily.     • fluticasone-salmeterol (ADVAIR DISKUS) 250-50 MCG/DOSE inhaler Inhale 1 puff into the lungs 2 times daily. 1 each 5   • sodium chloride (OCEAN) 0.65 % nasal spray Spray 1 spray in each nostril as needed for Congestion. 30 mL 5   • ADVANCED FIBER COMPLEX PO Take by mouth 2 times daily.     • pregabalin (LYRICA) 25 MG capsule Take 25 mg by mouth 2 times daily. prn     • Ascorbic Acid (VITAMIN C PO) Take 500 mg by mouth.     • Cholecalciferol (VITAMIN D PO) Take 1,000 Units by mouth.     • albuterol 108 (90 Base) MCG/ACT inhaler Inhale 2 puffs into the lungs every 4 hours as needed for Shortness of Breath or Wheezing. 1 Inhaler 2   • loratadine (CLARITIN) 10 MG tablet Take 1 tablet by mouth daily. 90 tablet 3   • Multiple Vitamins-Minerals (MULTIVITAMIN PO)        No current facility-administered medications for this visit.         The following items on the Medicare Health Risk Assessment were found to be positive  1.) Do you have an Advance directive, living will, or power of  for health care document that contains your wishes for end of life care?: No     2.) Would you like additional information on advance directives?: Yes     3.) During the past 4 weeks, how would you rate your health?: Fair     6 b.) How many servings of High Fiber / Whole Grain Foods to you have each day ( 1 serving = 1 cup cold cereal, 1/2 cup cooked cereal, 1 slice bread): 1 per day     6 c.) How many servings of Fried or High Fat Foods do you have each day (1 serving = 1 Wan, French Fries, chips, doughnut, fried chicken/fish): 3 per  day     8.) During the past 4 weeks, has your physical and emotional health limited your social activities with family, friends, neighbors, or other groups?: Quite a bit     11d.) Bodily pain: Always     11e.) Tiredness or Fatigue: Often     13.) Do you need help with any of the following activities?: Go shopping for groceries or clothes     Ms. Bee ambulates with a cane. She said she is independent with personal cares and household management tasks. She said a co-worker provides transportation for grocery shopping. She said she is established with [facility] behavioral health providers, with an appointment tomorrow morning. She said due to her anxiety about the pandemic, she has been eating high fat foods, and her stress and fatigue have increased. She said her CPAP helps her sleep. She said she recently learned after nine years in the same apartment, she will have to move which is causing her to feel stressed and frustrated.     Hearing screen: performed and reviewed    Advance Directive:   The patient has the following documents:  No Advance Directives on file. Patient offered documents.    Cognitive Assessment: no evidence of cognitive dysfunction by direct observation She said has no concerns about her memory or cognitive functioning.     Recent PHQ 2/9 Score    PHQ 2:  Date Adult PHQ 2 Score Adult PHQ 2 Interpretation   10/5/2020 4 Further screening needed       PHQ 9:  Date Adult PHQ 9 Score Adult PHQ 9 Interpretation   10/5/2020 7 Mild Depression   DEPRESSION ASSESSMENT/PLAN:  follow up with provider      Body mass index is 51.49 kg/m².  BMI ASSESSMENT/PLAN:  Patient is obese.    follow up with provider     Needed Screening/Treatment:   none  Recommended follow up:  see provider's note     See orders   See Patient Instructions section  No follow-ups on file.

## 2021-01-09 ENCOUNTER — HEALTH MAINTENANCE LETTER (OUTPATIENT)
Age: 71
End: 2021-01-09

## 2021-04-23 NOTE — PROGRESS NOTES
Bharath is a 70 year old who is being evaluated via a billable telephone visit.      What phone number would you like to be contacted at? [phone]  How would you like to obtain your AVS? MyChart    Assessment & Plan     Major depression in complete remission (H)  Well controlled, not interested in dose titration.    - citalopram (CELEXA) 20 MG tablet; Take 1 tablet (20 mg) by mouth daily  - **Basic metabolic panel FUTURE anytime; Future    Anxiety  Stable.    - citalopram (CELEXA) 20 MG tablet; Take 1 tablet (20 mg) by mouth daily    Nonintractable episodic headache, unspecified headache type  Imitrex effective, uses less than once a month.    - SUMAtriptan (IMITREX) 25 MG tablet; Take 1 tablet (25 mg) by mouth at onset of headache for migraine    Special screening for malignant neoplasms, colon    - Fecal colorectal cancer screen (FIT); Future    Visit for screening mammogram    - *MA Screening Digital Bilateral; Future    Lipid screening    - Lipid panel reflex to direct LDL Fasting; Future      Return in about 1 year (around 4/27/2022) for Routine Visit.    Precious Hay MD  [facility]   Bharath is a 70 year old who presents for the following health issues     History of Present Illness     Asthma:  She presents for follow up of asthma.  She has some cough, no wheezing, and no shortness of breath. She is not using a relief medication. She does not have a controller medication. Patient is aware of the following triggers: dust mites, occupational exposure and pollens. The patient has not had a visit to the Emergency Room, Urgent Care or Hospital due to asthma since the last clinic visit.     Mental Health Follow-up:  Patient presents to follow-up on Depression & Anxiety.Patient's depression since last visit has been:  No change  The patient is not having other symptoms associated with depression.  Patient's anxiety since last visit has been:  No change  The patient is  not having other symptoms associated with anxiety.  Any significant life events: No  Patient is feeling anxious or having panic attacks.  Patient has no concerns about alcohol or drug use.     Social History  Tobacco Use    Smoking status: Never Smoker    Smokeless tobacco: Never Used    Tobacco comment: no smokers in household  Alcohol use: No  Drug use: No      Today's PHQ-9         PHQ-9 Total Score:     (P) 1   PHQ-9 Q9 Thoughts of better off dead/self-harm past 2 weeks :   (P) Not at all   Thoughts of suicide or self harm:      Self-harm Plan:        Self-harm Action:          Safety concerns for self or others:           She eats 2-3 servings of fruits and vegetables daily.She consumes 0 sweetened beverage(s) daily.She exercises with enough effort to increase her heart rate 30 to 60 minutes per day.  She exercises with enough effort to increase her heart rate 3 or less days per week.   She is taking medications regularly.     Last PHQ-9 4/27/2021   1.  Little interest or pleasure in doing things 0   2.  Feeling down, depressed, or hopeless 0   3.  Trouble falling or staying asleep, or sleeping too much 1   4.  Feeling tired or having little energy 0   5.  Poor appetite or overeating 0   6.  Feeling bad about yourself 0   7.  Trouble concentrating 0   8.  Moving slowly or restless 0   Q9: Thoughts of better off dead/self-harm past 2 weeks 0   PHQ-9 Total Score 1   Difficulty at work, home, or with people -     SGAE-7  4/27/2021   1. Feeling nervous, anxious, or on edge 1   2. Not being able to stop or control worrying 0   3. Worrying too much about different things 0   4. Trouble relaxing 0   5. Being so restless that it is hard to sit still 0   6. Becoming easily annoyed or irritable 0   7. Feeling afraid, as if something awful might happen 1   SAGE-7 Total Score 2   If you checked any problems, how difficult have they made it for you to do your work, take care of things at home, or get along with other people?  -     Granddaughter got  in the past year and is now expecting, finds out gender tomorrow.    Walking about 3 times a week.  Finds that sugar triggers a migraine.  Uses Imitrex about once a month.  Usually ibuprofen is effective, unless she knows it will go to a full blown migraine and then will take Imitrex right away.      Review of Systems         Objective    Vitals - Patient Reported  Pain Score: No Pain (0)      Vitals:  No vitals were obtained today due to virtual visit.    Physical Exam   Gen: alert and no distress  PSYCH: Alert and oriented times 3; coherent speech, normal   rate and volume, able to articulate logical thoughts, able   to abstract reason, no tangential thoughts, no hallucinations   or delusions  Her affect is normal  RESP: No cough, no audible wheezing, able to talk in full sentences  Remainder of exam unable to be completed due to telephone visits            Phone call duration: 9 minutes

## 2021-04-27 ENCOUNTER — VIRTUAL VISIT (OUTPATIENT)
Dept: FAMILY MEDICINE | Facility: OTHER | Age: 71
End: 2021-04-27
Payer: COMMERCIAL

## 2021-04-27 DIAGNOSIS — F41.9 ANXIETY: ICD-10-CM

## 2021-04-27 DIAGNOSIS — Z12.11 SPECIAL SCREENING FOR MALIGNANT NEOPLASMS, COLON: ICD-10-CM

## 2021-04-27 DIAGNOSIS — R51.9 NONINTRACTABLE EPISODIC HEADACHE, UNSPECIFIED HEADACHE TYPE: ICD-10-CM

## 2021-04-27 DIAGNOSIS — F32.5 MAJOR DEPRESSION IN COMPLETE REMISSION (H): Primary | ICD-10-CM

## 2021-04-27 DIAGNOSIS — Z13.220 LIPID SCREENING: ICD-10-CM

## 2021-04-27 DIAGNOSIS — Z12.31 VISIT FOR SCREENING MAMMOGRAM: ICD-10-CM

## 2021-04-27 PROCEDURE — 99214 OFFICE O/P EST MOD 30 MIN: CPT | Mod: 95 | Performed by: FAMILY MEDICINE

## 2021-04-27 RX ORDER — CITALOPRAM HYDROBROMIDE 20 MG/1
20 TABLET ORAL DAILY
Qty: 90 TABLET | Refills: 3 | Status: SHIPPED | OUTPATIENT
Start: 2021-04-27 | End: 2022-03-25

## 2021-04-27 RX ORDER — SUMATRIPTAN 25 MG/1
25 TABLET, FILM COATED ORAL
Qty: 12 TABLET | Refills: 3 | Status: SHIPPED | OUTPATIENT
Start: 2021-04-27 | End: 2022-03-25

## 2021-04-27 ASSESSMENT — ANXIETY QUESTIONNAIRES
2. NOT BEING ABLE TO STOP OR CONTROL WORRYING: NOT AT ALL
4. TROUBLE RELAXING: NOT AT ALL
1. FEELING NERVOUS, ANXIOUS, OR ON EDGE: SEVERAL DAYS
GAD7 TOTAL SCORE: 2
7. FEELING AFRAID AS IF SOMETHING AWFUL MIGHT HAPPEN: SEVERAL DAYS
5. BEING SO RESTLESS THAT IT IS HARD TO SIT STILL: NOT AT ALL
GAD7 TOTAL SCORE: 2
6. BECOMING EASILY ANNOYED OR IRRITABLE: NOT AT ALL
GAD7 TOTAL SCORE: 2
7. FEELING AFRAID AS IF SOMETHING AWFUL MIGHT HAPPEN: SEVERAL DAYS
3. WORRYING TOO MUCH ABOUT DIFFERENT THINGS: NOT AT ALL

## 2021-04-27 ASSESSMENT — PAIN SCALES - GENERAL: PAINLEVEL: NO PAIN (0)

## 2021-04-27 ASSESSMENT — PATIENT HEALTH QUESTIONNAIRE - PHQ9
SUM OF ALL RESPONSES TO PHQ QUESTIONS 1-9: 1
10. IF YOU CHECKED OFF ANY PROBLEMS, HOW DIFFICULT HAVE THESE PROBLEMS MADE IT FOR YOU TO DO YOUR WORK, TAKE CARE OF THINGS AT HOME, OR GET ALONG WITH OTHER PEOPLE: NOT DIFFICULT AT ALL
SUM OF ALL RESPONSES TO PHQ QUESTIONS 1-9: 1

## 2021-04-28 ASSESSMENT — ASTHMA QUESTIONNAIRES: ACT_TOTALSCORE: 23

## 2021-04-28 ASSESSMENT — ANXIETY QUESTIONNAIRES: GAD7 TOTAL SCORE: 2

## 2021-07-08 DIAGNOSIS — F32.5 MAJOR DEPRESSION IN COMPLETE REMISSION (H): ICD-10-CM

## 2021-07-08 DIAGNOSIS — Z13.220 LIPID SCREENING: ICD-10-CM

## 2021-07-08 LAB
ANION GAP SERPL CALCULATED.3IONS-SCNC: 4 MMOL/L (ref 3–14)
BUN SERPL-MCNC: 12 MG/DL (ref 7–30)
CALCIUM SERPL-MCNC: 8.8 MG/DL (ref 8.5–10.1)
CHLORIDE SERPL-SCNC: 110 MMOL/L (ref 94–109)
CHOLEST SERPL-MCNC: 164 MG/DL
CO2 SERPL-SCNC: 26 MMOL/L (ref 20–32)
CREAT SERPL-MCNC: 0.68 MG/DL (ref 0.52–1.04)
GFR SERPL CREATININE-BSD FRML MDRD: 89 ML/MIN/{1.73_M2}
GLUCOSE SERPL-MCNC: 116 MG/DL (ref 70–99)
HDLC SERPL-MCNC: 47 MG/DL
LDLC SERPL CALC-MCNC: 98 MG/DL
NONHDLC SERPL-MCNC: 117 MG/DL
POTASSIUM SERPL-SCNC: 4.3 MMOL/L (ref 3.4–5.3)
SODIUM SERPL-SCNC: 140 MMOL/L (ref 133–144)
TRIGL SERPL-MCNC: 96 MG/DL

## 2021-07-08 PROCEDURE — 80061 LIPID PANEL: CPT | Performed by: FAMILY MEDICINE

## 2021-07-08 PROCEDURE — 80048 BASIC METABOLIC PNL TOTAL CA: CPT | Performed by: FAMILY MEDICINE

## 2021-07-08 PROCEDURE — 36415 COLL VENOUS BLD VENIPUNCTURE: CPT | Performed by: FAMILY MEDICINE

## 2021-08-28 ENCOUNTER — HEALTH MAINTENANCE LETTER (OUTPATIENT)
Age: 71
End: 2021-08-28

## 2021-10-20 ENCOUNTER — TELEPHONE (OUTPATIENT)
Dept: FAMILY MEDICINE | Facility: OTHER | Age: 71
End: 2021-10-20

## 2021-10-23 ENCOUNTER — HEALTH MAINTENANCE LETTER (OUTPATIENT)
Age: 71
End: 2021-10-23

## 2022-02-12 ENCOUNTER — HEALTH MAINTENANCE LETTER (OUTPATIENT)
Age: 72
End: 2022-02-12

## 2022-03-25 ENCOUNTER — MYC MEDICAL ADVICE (OUTPATIENT)
Dept: FAMILY MEDICINE | Facility: OTHER | Age: 72
End: 2022-03-25

## 2022-03-25 ENCOUNTER — VIRTUAL VISIT (OUTPATIENT)
Dept: FAMILY MEDICINE | Facility: OTHER | Age: 72
End: 2022-03-25
Payer: COMMERCIAL

## 2022-03-25 DIAGNOSIS — Z00.00 ENCOUNTER FOR MEDICARE ANNUAL WELLNESS EXAM: Primary | ICD-10-CM

## 2022-03-25 DIAGNOSIS — Z12.11 SPECIAL SCREENING FOR MALIGNANT NEOPLASMS, COLON: ICD-10-CM

## 2022-03-25 DIAGNOSIS — R51.9 NONINTRACTABLE EPISODIC HEADACHE, UNSPECIFIED HEADACHE TYPE: ICD-10-CM

## 2022-03-25 DIAGNOSIS — F32.5 MAJOR DEPRESSION IN COMPLETE REMISSION (H): ICD-10-CM

## 2022-03-25 DIAGNOSIS — F41.9 ANXIETY: ICD-10-CM

## 2022-03-25 DIAGNOSIS — Z12.31 VISIT FOR SCREENING MAMMOGRAM: ICD-10-CM

## 2022-03-25 DIAGNOSIS — R73.01 ABNORMAL FASTING GLUCOSE: ICD-10-CM

## 2022-03-25 PROCEDURE — 99214 OFFICE O/P EST MOD 30 MIN: CPT | Mod: 25 | Performed by: FAMILY MEDICINE

## 2022-03-25 PROCEDURE — 99397 PER PM REEVAL EST PAT 65+ YR: CPT | Performed by: FAMILY MEDICINE

## 2022-03-25 RX ORDER — SUMATRIPTAN 50 MG/1
50 TABLET, FILM COATED ORAL
Qty: 18 TABLET | Refills: 3 | Status: SHIPPED | OUTPATIENT
Start: 2022-03-25

## 2022-03-25 RX ORDER — CITALOPRAM HYDROBROMIDE 20 MG/1
20 TABLET ORAL DAILY
Qty: 90 TABLET | Refills: 3 | Status: SHIPPED | OUTPATIENT
Start: 2022-03-25 | End: 2023-04-11

## 2022-03-25 ASSESSMENT — ENCOUNTER SYMPTOMS
HEADACHES: 0
MYALGIAS: 0
PALPITATIONS: 0
SORE THROAT: 0
COUGH: 0
EYE PAIN: 0
HEMATOCHEZIA: 0
WEAKNESS: 0
ARTHRALGIAS: 0
NERVOUS/ANXIOUS: 0
DIZZINESS: 0
ABDOMINAL PAIN: 0
DYSURIA: 0
FREQUENCY: 0
NAUSEA: 0
FEVER: 0
DIARRHEA: 0
PARESTHESIAS: 0
SHORTNESS OF BREATH: 0
HEARTBURN: 0
BREAST MASS: 0
HEMATURIA: 0
CONSTIPATION: 0
JOINT SWELLING: 0
CHILLS: 0

## 2022-03-25 ASSESSMENT — ANXIETY QUESTIONNAIRES
7. FEELING AFRAID AS IF SOMETHING AWFUL MIGHT HAPPEN: NOT AT ALL
GAD7 TOTAL SCORE: 0
6. BECOMING EASILY ANNOYED OR IRRITABLE: NOT AT ALL
IF YOU CHECKED OFF ANY PROBLEMS ON THIS QUESTIONNAIRE, HOW DIFFICULT HAVE THESE PROBLEMS MADE IT FOR YOU TO DO YOUR WORK, TAKE CARE OF THINGS AT HOME, OR GET ALONG WITH OTHER PEOPLE: NOT DIFFICULT AT ALL
5. BEING SO RESTLESS THAT IT IS HARD TO SIT STILL: NOT AT ALL
3. WORRYING TOO MUCH ABOUT DIFFERENT THINGS: NOT AT ALL
1. FEELING NERVOUS, ANXIOUS, OR ON EDGE: NOT AT ALL
2. NOT BEING ABLE TO STOP OR CONTROL WORRYING: NOT AT ALL

## 2022-03-25 ASSESSMENT — ASTHMA QUESTIONNAIRES
ACT_TOTALSCORE: 25
ACT_TOTALSCORE: 25

## 2022-03-25 ASSESSMENT — PATIENT HEALTH QUESTIONNAIRE - PHQ9
SUM OF ALL RESPONSES TO PHQ QUESTIONS 1-9: 0
5. POOR APPETITE OR OVEREATING: NOT AT ALL

## 2022-03-25 ASSESSMENT — PAIN SCALES - GENERAL: PAINLEVEL: NO PAIN (0)

## 2022-03-25 ASSESSMENT — ACTIVITIES OF DAILY LIVING (ADL): CURRENT_FUNCTION: NO ASSISTANCE NEEDED

## 2022-03-25 NOTE — PATIENT INSTRUCTIONS
Patient Education   Personalized Prevention Plan  You are due for the preventive services outlined below.  Your care team is available to assist you in scheduling these services.  If you have already completed any of these items, please share that information with your care team to update in your medical record.  Health Maintenance Due   Topic Date Due     Discuss Advance Care Planning  Never done     COVID-19 Vaccine (1) Never done     Zoster (Shingles) Vaccine (2 of 3) 05/01/2013     Asthma Action Plan - yearly  12/14/2017     Mammogram  05/08/2021     Colorectal Cancer Screening  05/19/2021     Flu Vaccine (1) 09/01/2021

## 2022-03-25 NOTE — PROGRESS NOTES
"Bharath is a 71 year old who is being evaluated via a billable video visit.      How would you like to obtain your AVS? MyChart  If the video visit is dropped, the invitation should be resent by: Text to cell phone: [phone]   Will anyone else be joining your video visit? No      Video Start Time: 3:46    Assessment & Plan     Encounter for Medicare annual wellness exam  She will return for fasting labs.    Nonintractable episodic headache, unspecified headache type  Has a migraine that last 3 days about every 3 months.  She will wait a while before she tries the Imitrex but is not sure that it is helpful.  We will increase her Imitrex from 25 mg tablet to 50 mg tablet.    - SUMAtriptan (IMITREX) 50 MG tablet; Take 1 tablet (50 mg) by mouth at onset of headache for migraine    Major depression in complete remission (H)/Anxiety  She finds the citalopram effective for keeping her mood controlled and is not interested in trying to get off of the medication.  - citalopram (CELEXA) 20 MG tablet; Take 1 tablet (20 mg) by mouth daily    Visit for screening mammogram  - MA Screen Bilateral w/Marlo; Future    Special screening for malignant neoplasms, colon  We discussed her options and she was interested in doing the Cologuard.  - COLOGUARD(EXACT SCIENCES)      Return in about 53 weeks (around 3/31/2023) for Annual Wellness Visit.    Precious Hay MD  [facility]    Delilah Sinha is a 71 year old who presents for the following health issues     Healthy Habits:     In general, how would you rate your overall health?  Good    Frequency of exercise:  2-3 days/week    Duration of exercise:  15-30 minutes    Do you usually eat at least 4 servings of fruit and vegetables a day, include whole grains    & fiber and avoid regularly eating high fat or \"junk\" foods?  Yes    Taking medications regularly:  Yes    Medication side effects:  Not applicable    Ability to successfully perform activities " "of daily living:  No assistance needed    Home Safety:  No safety concerns identified    Hearing Impairment:  No hearing concerns    In the past 6 months, have you been bothered by leaking of urine?  No    In general, how would you rate your overall mental or emotional health?  Good      PHQ-2 Total Score: 0    Additional concerns today:  No       Annual Wellness Visit    Patient has been advised of split billing requirements and indicates understanding: Yes     Are you in the first 12 months of your Medicare Part B coverage?  No    Physical Health:    In general, how would you rate your overall physical health? good    Outside of work, how many days during the week do you exercise?2-3 days/week    Outside of work, approximately how many minutes a day do you exercise?30-45 minutes    If you drink alcohol do you typically have >3 drinks per day or >7 drinks per week? No    Do you usually eat at least 4 servings of fruit and vegetables a day, include whole grains & fiber and avoid regularly eating high fat or \"junk\" foods? Yes    Do you have any problems taking medications regularly? No    Do you have any side effects from medications? none    Needs assistance for the following daily activities: no assistance needed    Which of the following safety concerns are present in your home?  none identified     Hearing impairment: No    In the past 6 months, have you been bothered by leaking of urine? no    [facility]   Weight: Unable to obtain due to video visit  Height: Unable to obtain due to video visit  BMI: Unable to obtain due to video visit  Blood Pressure: Unable to obtain due to video visit    Mental Health:    In general, how would you rate your overall mental or emotional health? excellent  PHQ-2 Score:      Do you feel safe in your environment? Yes    Have you ever done Advance Care Planning? (For example, a Health Directive, POLST, or a discussion with a medical provider or your loved ones about your " wishes)? No, advance care planning information given to patient to review.  Patient plans to discuss their wishes with loved ones or provider.      Fall risk:  Fallen 2 or more times in the past year?: No  Any fall with injury in the past year?: No    Cognitive Screenin) Repeat 3 items (Leader, Season, Table)    2) Clock draw: not done  3) 3 item recall: Recalls 3 objects  Results: 3 items recalled: COGNITIVE IMPAIRMENT LESS LIKELY    Mini-CogTM Copyright MARY Tuttel. Licensed by the author for use in [facility] [employer]; reprinted with permission (sodeyvi@[email or web address]). All rights reserved.      Do you have sleep apnea, excessive snoring or daytime drowsiness?: no    Current providers sharing in care for this patient include:   Patient Care Team:  Precious Hay MD as PCP - General  Precious Hay MD as Assigned PCP      Review of Systems   Constitutional: Negative for chills and fever.   HENT: Negative for congestion, ear pain, hearing loss and sore throat.    Eyes: Negative for pain and visual disturbance.   Respiratory: Negative for cough and shortness of breath.    Cardiovascular: Negative for chest pain, palpitations and peripheral edema.   Gastrointestinal: Negative for abdominal pain, constipation, diarrhea, heartburn, hematochezia and nausea.   Breasts:  Negative for tenderness, breast mass and discharge.   Genitourinary: Negative for dysuria, frequency, genital sores, hematuria, pelvic pain, urgency, vaginal bleeding and vaginal discharge.   Musculoskeletal: Negative for arthralgias, joint swelling and myalgias.   Skin: Negative for rash.   Neurological: Negative for dizziness, weakness, headaches and paresthesias.   Psychiatric/Behavioral: Negative for mood changes. The patient is not nervous/anxious.           Objective    Vitals - Patient Reported  Pain Score: No Pain (0)      Vitals:  No vitals were obtained today due to virtual visit.    Physical Exam   GENERAL: Healthy, alert and no  distress  EYES: Eyes grossly normal to inspection.  No discharge or erythema, or obvious scleral/conjunctival abnormalities.  RESP: No audible wheeze, cough, or visible cyanosis.  No visible retractions or increased work of breathing.    SKIN: Visible skin clear. No significant rash, abnormal pigmentation or lesions.  NEURO: Cranial nerves grossly intact.  Mentation and speech appropriate for age.  PSYCH: Mentation appears normal, affect normal/bright, judgement and insight intact, normal speech and appearance well-groomed.          Video-Visit Details    Type of service:  Video Visit    Video End Time:4:02 PM    Originating Location (pt. Location): Home    Distant Location (provider location):  [facility]     Platform used for Video Visit: [email or web address]

## 2022-03-26 ASSESSMENT — ANXIETY QUESTIONNAIRES: GAD7 TOTAL SCORE: 0

## 2022-04-04 ENCOUNTER — LAB (OUTPATIENT)
Dept: LAB | Facility: OTHER | Age: 72
End: 2022-04-04
Payer: COMMERCIAL

## 2022-04-04 DIAGNOSIS — Z00.00 ENCOUNTER FOR MEDICARE ANNUAL WELLNESS EXAM: ICD-10-CM

## 2022-04-04 LAB
ALBUMIN SERPL-MCNC: 3.7 G/DL (ref 3.4–5)
ALP SERPL-CCNC: 89 U/L (ref 40–150)
ALT SERPL W P-5'-P-CCNC: 30 U/L (ref 0–50)
ANION GAP SERPL CALCULATED.3IONS-SCNC: 3 MMOL/L (ref 3–14)
AST SERPL W P-5'-P-CCNC: 20 U/L (ref 0–45)
BILIRUB SERPL-MCNC: 0.6 MG/DL (ref 0.2–1.3)
BUN SERPL-MCNC: 12 MG/DL (ref 7–30)
CALCIUM SERPL-MCNC: 8.8 MG/DL (ref 8.5–10.1)
CHLORIDE BLD-SCNC: 110 MMOL/L (ref 94–109)
CHOLEST SERPL-MCNC: 166 MG/DL
CO2 SERPL-SCNC: 28 MMOL/L (ref 20–32)
CREAT SERPL-MCNC: 0.67 MG/DL (ref 0.52–1.04)
FASTING STATUS PATIENT QL REPORTED: YES
GFR SERPL CREATININE-BSD FRML MDRD: >90 ML/MIN/1.73M2
GLUCOSE BLD-MCNC: 125 MG/DL (ref 70–99)
HDLC SERPL-MCNC: 50 MG/DL
LDLC SERPL CALC-MCNC: 101 MG/DL
NONHDLC SERPL-MCNC: 116 MG/DL
POTASSIUM BLD-SCNC: 4.1 MMOL/L (ref 3.4–5.3)
PROT SERPL-MCNC: 7.1 G/DL (ref 6.8–8.8)
SODIUM SERPL-SCNC: 141 MMOL/L (ref 133–144)
TRIGL SERPL-MCNC: 77 MG/DL

## 2022-04-04 PROCEDURE — 36415 COLL VENOUS BLD VENIPUNCTURE: CPT

## 2022-04-04 PROCEDURE — 80053 COMPREHEN METABOLIC PANEL: CPT

## 2022-04-04 PROCEDURE — 80061 LIPID PANEL: CPT

## 2022-04-08 ENCOUNTER — NURSE TRIAGE (OUTPATIENT)
Dept: FAMILY MEDICINE | Facility: OTHER | Age: 72
End: 2022-04-08

## 2022-04-08 NOTE — TELEPHONE ENCOUNTER
Called [facility] and they are unable to take patient. Will not be able to get patient in for MRI.     Called patient and informed her.     Advised patient to go into [facility] in [place].     Patient states she will call her insurance to verify coverage and go from there. Most likely will go into [facility] or tomorrow.     Otherwise recommended emergency room as she should be seen prior to Monday. Call with any questions or concerns. Patient states understanding.     KIMBERLY Sterling/[place] [facility] [place]  April 8, 2022

## 2022-04-08 NOTE — TELEPHONE ENCOUNTER
"Wednesday afternoon went to go stand from a sitting position and something \"popped in the right leg, right side of the knee.\"    Had to sit for a few hours and had extreme pain in the leg. Believes it might be a ligament.     Hard to walk and has to keep the leg straight, but still able to walk.    When knee is bent the pain goes up to 9/10 in the leg. Otherwise the leg is just a dull ache.     Can push on the knee and there is no pain, the pain is only when it's bent. The upper side of the calf is where the pain is coming from, going around the side.     No swelling, no redness, no SOB, no chest pain.     Interfering with daily activities as she has to keep the leg straight.     Has tried Ibuprofen and ice packs, do not help.     Had an injury on the same leg about a year ago, and wondering if this is related. Never had any imaging back when this happened.    Advised patient be seen today due to mobility issues with the leg. Patient is looking for the least cost visit.     Will see if provider can put in [facility] referral, patient wants MRI and agrees going to [facility].      Rowan Esqueda RN  [place] [facility]  April 8, 2022      Reason for Disposition    Injury interferes with work or school    Additional Information    Negative: Major bleeding (actively dripping or spurting) that can't be stopped    Negative: Bullet, stabbed by knife, or other serious penetrating wound    Negative: Looks like a dislocated joint (crooked or deformed)    Negative: Can't stand (bear weight) or walk    Negative: Serious injury with multiple fractures (broken bones)    Negative: Sounds like a life-threatening emergency to the triager    Negative: Wound looks infected    Negative: Leg pain from overuse (e.g., sports, running, physical work)    Negative: Leg pain not from an injury    Negative: Hip injury is main concern    Negative: Knee injury is main concern    Negative: Foot or ankle injury is main concern    Negative: " SEVERE pain (e.g. excruciating)    Negative: Skin is split open or gaping (or length > 1/2 inch or 12 mm)    Negative: Bleeding won't stop after 10 minutes of direct pressure (using correct technique)    Negative: Dirt in the wound and not removed with 15 minutes of scrubbing    Negative: Sounds like a serious injury to the triager    Negative: Looks infected (e.g., spreading redness, pus, red streak)    Negative: No prior tetanus shots (or is not fully vaccinated) and any wound (e.g., cut or scrape)    Negative: HIV positive or severe immunodeficiency (severely weak immune system) and DIRTY cut or scrape    Negative: Patient wants to be seen    Protocols used: LEG INJURY-A-OH

## 2022-04-11 ENCOUNTER — VIRTUAL VISIT (OUTPATIENT)
Dept: FAMILY MEDICINE | Facility: OTHER | Age: 72
End: 2022-04-11
Payer: COMMERCIAL

## 2022-04-11 DIAGNOSIS — R29.898 POPPING SOUND OF KNEE JOINT: ICD-10-CM

## 2022-04-11 DIAGNOSIS — M25.561 ACUTE PAIN OF RIGHT KNEE: Primary | ICD-10-CM

## 2022-04-11 PROCEDURE — 99215 OFFICE O/P EST HI 40 MIN: CPT | Mod: 95 | Performed by: FAMILY MEDICINE

## 2022-04-11 NOTE — TELEPHONE ENCOUNTER
Patient calling back stating she was seen at [facility] in [place] on Friday for her severe right leg pain. States they gave her an x-ray of the knee which is not where the pain is coming from, and did not do an MRI.     Patient is still requesting an MRI or other imaging because she believes it's a ligament issue.     The pain is severe and states the UC did not help her.     Scheduled patient for virtual visit with FRANCISCO as PCP is out of clinic and patient wants imaging as soon as possible.     KIMBERLY Sterling/[facility]  April 11, 2022

## 2022-04-11 NOTE — PROGRESS NOTES
Bharath is a 71 year old who is being evaluated via a billable video visit.      How would you like to obtain your AVS? MyChart  If the video visit is dropped, the invitation should be resent by: Text to cell phone: [phone]  Will anyone else be joining your video visit? No      Video Start Time: 4:08 PM    Assessment & Plan       ICD-10-CM    1. Acute pain of right knee  M25.561 MR Knee Right w/o Contrast   2. Popping sound of knee joint  R29.898 MR Knee Right w/o Contrast      Unable to review the findings from her urgent care visit last Friday.  These are not available to me at this time.  Extensive discussion with patient regarding the nature and location of her symptoms as it was somewhat difficult for us to understand each other for some reason.  Patient has not responded significantly to NSAIDs.  She does report some improvement with ice and with rest as well as careful movements.  Exact etiology of her pain is unclear.  Per her report of the x-ray, there was osteoarthritis present.  This may certainly be contributing to her pain, but does not explain the rapid change in her symptoms.  I am most suspicious of a hamstring strain or other tendinitis.  Because of this, I feel it would be reasonable to proceed with an MRI as the patient is requesting.  I did also discussed the possibility of adjusting her NSAIDs, steroid injection, etc.    I did also recommend that she get Covid vaccination and complete her colon cancer screening.    Portions of this note were completed using Dragon dictation software.  Although reviewed, there may be typographical and other inadvertent errors that remain.     Ordering of each unique test  40 minutes spent on the date of the encounter doing chart review, history and exam, documentation and further activities per the note       Patient Instructions   Thank you for visiting [facility]    Let's see what your MRI shows.    Depending on results, we might recommend  ortho consult, PT, and/or injection.    OK to continue ice, analgesics, etc. To help control your symptoms.      Turn in your colon screening kit.      I do strongly recommend Covid vaccination.  The risks of getting Covid greatly exceed the risks of vaccination.  Let me know what questions and concerns you have.     Contact us or return if questions or concerns.     Have a nice day!    Dr. Pollard     Return in about 4 weeks (around 5/9/2022) for Recheck.      If you need medication refills, please contact your pharmacy 3 days before your prescriptions runs out or download the [facility] ricardo for your smart phone. If you are out of refills, your pharmacy will contact contact the clinic.                                     [facility] Assistance [phone]                    Return in about 4 weeks (around 5/9/2022) for Recheck.    Jb Pollard MD, MD  [facility] QUANG Sinha is a 71 year old who presents for the following health issues     Musculoskeletal Problem    History of Present Illness       Reason for visit:  Leg pain  Symptom onset:  1-3 days ago  Symptoms include:  Pain  Symptom intensity:  Severe  Symptom progression:  Staying the same  Had these symptoms before:  Yes  Has tried/received treatment for these symptoms:  No  What makes it worse:  Rest  What makes it better:  Rest    She eats 2-3 servings of fruits and vegetables daily.She consumes 1 sweetened beverage(s) daily.She exercises with enough effort to increase her heart rate 9 or less minutes per day.  She exercises with enough effort to increase her heart rate 3 or less days per week.   She is taking medications regularly.     Last year (spring), she turned her right leg in and dropped a vacuum  onto the inside of her lower leg.  Took a long time to heal and had a lot of pain on the outside of her lower leg.  Has had a lot of pain since then.  Would hurt any time she would exert herself.    On  Wednesday, went to stand up and felt a sensation like a pop on the outside of her right leg below her knee.  This didn't hurt, but when she went to stand up, had excruciating pain.  She sat and rested, waiting about 10 minutes.  Pain was still severe.  Was sitting in a meeting for 2 hours.  Not a lot of pain while sitting.  Hurts more when she bends her knee.  Hasn't been able to bend her knee without pain since then.      She spoke with triage RN on Friday.  She was seen at [facility] and had X-rays done, but not an MRI. All she wanted was an MRI.    She does have some swelling in her lower leg.  Feels very tight.  Notes normal function of her knee.  Swelling around her right knee as well.  Pain extends from posterior distal hamstrings down to mid-calf.      Ice, ibuprofen, walking (with leg straight), resting (yesterday) didn't help other than possible slight improvement with resting yesterday.  Ice helped a bit.      Denies recent travel or recent injury to her leg.      Review of Systems   Constitutional, HEENT, cardiovascular, pulmonary, gi and gu systems are negative, except as otherwise noted.      Objective           Vitals:  No vitals were obtained today due to virtual visit.    Physical Exam   GENERAL: Healthy, alert and no distress  EYES: Eyes grossly normal to inspection.  No discharge or erythema, or obvious scleral/conjunctival abnormalities.  RESP: No audible wheeze, cough, or visible cyanosis.  No visible retractions or increased work of breathing.    MS: Unable to palpate myself.  Patient reports pain just proximal and distal to the knee posteriorly.  There is some pain behind the knee, but not a great deal.  Reportedly, there is significant swelling as well.  Patient is able to bear weight on this but does report significant discomfort with that.  Pain is lessened if her leg is straight.  SKIN: Visible skin clear. No significant rash, abnormal pigmentation or lesions.  NEURO: Cranial nerves  grossly intact.  Mentation and speech appropriate for age.  PSYCH: Mentation appears normal, affect normal/bright, judgement and insight intact, normal speech and appearance well-groomed.    Lab on 04/04/2022   Component Date Value Ref Range Status     Cholesterol 04/04/2022 166  <200 mg/dL Final     Triglycerides 04/04/2022 77  <150 mg/dL Final     Direct Measure HDL 04/04/2022 50  >=50 mg/dL Final     LDL Cholesterol Calculated 04/04/2022 101 (A) <=100 mg/dL Final     Non HDL Cholesterol 04/04/2022 116  <130 mg/dL Final     Patient Fasting > 8hrs? 04/04/2022 Yes   Final     Sodium 04/04/2022 141  133 - 144 mmol/L Final     Potassium 04/04/2022 4.1  3.4 - 5.3 mmol/L Final     Chloride 04/04/2022 110 (A) 94 - 109 mmol/L Final     Carbon Dioxide (CO2) 04/04/2022 28  20 - 32 mmol/L Final     Anion Gap 04/04/2022 3  3 - 14 mmol/L Final     Urea Nitrogen 04/04/2022 12  7 - 30 mg/dL Final     Creatinine 04/04/2022 0.67  0.52 - 1.04 mg/dL Final     Calcium 04/04/2022 8.8  8.5 - 10.1 mg/dL Final     Glucose 04/04/2022 125 (A) 70 - 99 mg/dL Final     Alkaline Phosphatase 04/04/2022 89  40 - 150 U/L Final     AST 04/04/2022 20  0 - 45 U/L Final     ALT 04/04/2022 30  0 - 50 U/L Final     Protein Total 04/04/2022 7.1  6.8 - 8.8 g/dL Final     Albumin 04/04/2022 3.7  3.4 - 5.0 g/dL Final     Bilirubin Total 04/04/2022 0.6  0.2 - 1.3 mg/dL Final     GFR Estimate 04/04/2022 >90  >60 mL/min/1.73m2 Final    Effective December 21, 2021 eGFRcr in adults is calculated using the 2021 CKD-EPI creatinine equation which includes age and gender (Selena tam al., NEJM, DOI: 10.1056/PLHDto5219646)               Video-Visit Details    Type of service:  Video Visit    Video End Time:4:46 PM    Originating Location (pt. Location): Home    Distant Location (provider location):  [facility]     Platform used for Video Visit: [email or web address]

## 2022-04-12 ENCOUNTER — TELEPHONE (OUTPATIENT)
Dept: FAMILY MEDICINE | Facility: OTHER | Age: 72
End: 2022-04-12
Payer: COMMERCIAL

## 2022-04-12 DIAGNOSIS — M25.561 ACUTE PAIN OF RIGHT KNEE: Primary | ICD-10-CM

## 2022-04-12 DIAGNOSIS — R29.898 POPPING SOUND OF KNEE JOINT: ICD-10-CM

## 2022-04-12 LAB — COLOGUARD-ABSTRACT: NEGATIVE

## 2022-04-12 NOTE — TELEPHONE ENCOUNTER
A stat MRI is not necessary or appropriate for this.  She is able to walk with her current knee condition.  I am not going to put her ahead of people that are having strokes and are unable to walk.

## 2022-04-12 NOTE — TELEPHONE ENCOUNTER
"Patient is upset about providers decision. She is requesting provider call her to discuss, as she does not feel she should have to pay for his visit as she \"did not get what she wanted\".    RN tried to reassure patient what constitutes STAT MRI criteria. Patient states she cannot walk normally and her pain is severe. RN advised she could be seen in the ER, and then she hung up on RN.     Paige Ellington RN on 4/12/2022 at 2:55 PM    "

## 2022-04-12 NOTE — TELEPHONE ENCOUNTER
I am not sure if her symptoms require STAT MRI--will forward to the provider who discussed this with her yesterday.

## 2022-04-12 NOTE — TELEPHONE ENCOUNTER
Patient calling back regarding status of routine MRI order. She is requesting that the MRI Knee order be changed to STAT so she can have this done sooner.      Patient requesting PCP also be notified.     Paige Ellington RN on 4/12/2022 at 12:52 PM

## 2022-04-12 NOTE — TELEPHONE ENCOUNTER
Called patient as requested.  I explained that a stat MRI indicated that she has the same priority as a patient with an ongoing stroke, inability to walk due to trauma, etc.  I indicated that this is not appropriate.  My recollection is that I told the patient that she would be contacted within a day or 2 to schedule her MRI but that it may take some time to get set up, that it may not be as quickly as she would like.  She has a different recollection than I do.    I told her that I would be happy to approach her pain management from a different angle if she would like.  Briefly discussed a few options.  She requested a physical therapy referral.  This was given.  I was explicitly clear with her that they will typically call her within 1 to 2 days but that I do not know when they can get her in.    Finally, I did agree that I would forward her concerns to my management and they would decide if no charging her was appropriate.

## 2022-05-27 ENCOUNTER — NURSE TRIAGE (OUTPATIENT)
Dept: FAMILY MEDICINE | Facility: OTHER | Age: 72
End: 2022-05-27
Payer: COMMERCIAL

## 2022-06-16 ENCOUNTER — TRANSFERRED RECORDS (OUTPATIENT)
Dept: HEALTH INFORMATION MANAGEMENT | Facility: CLINIC | Age: 72
End: 2022-06-16

## 2022-10-09 ENCOUNTER — HEALTH MAINTENANCE LETTER (OUTPATIENT)
Age: 72
End: 2022-10-09

## 2022-12-05 ENCOUNTER — TRANSFERRED RECORDS (OUTPATIENT)
Dept: HEALTH INFORMATION MANAGEMENT | Facility: CLINIC | Age: 72
End: 2022-12-05

## 2023-01-19 ASSESSMENT — ASTHMA QUESTIONNAIRES
ACT_TOTALSCORE: 25
QUESTION_3 LAST FOUR WEEKS HOW OFTEN DID YOUR ASTHMA SYMPTOMS (WHEEZING, COUGHING, SHORTNESS OF BREATH, CHEST TIGHTNESS OR PAIN) WAKE YOU UP AT NIGHT OR EARLIER THAN USUAL IN THE MORNING: NOT AT ALL
QUESTION_5 LAST FOUR WEEKS HOW WOULD YOU RATE YOUR ASTHMA CONTROL: COMPLETELY CONTROLLED
QUESTION_4 LAST FOUR WEEKS HOW OFTEN HAVE YOU USED YOUR RESCUE INHALER OR NEBULIZER MEDICATION (SUCH AS ALBUTEROL): NOT AT ALL
QUESTION_2 LAST FOUR WEEKS HOW OFTEN HAVE YOU HAD SHORTNESS OF BREATH: NOT AT ALL
ACT_TOTALSCORE: 25
QUESTION_1 LAST FOUR WEEKS HOW MUCH OF THE TIME DID YOUR ASTHMA KEEP YOU FROM GETTING AS MUCH DONE AT WORK, SCHOOL OR AT HOME: NONE OF THE TIME

## 2023-01-20 ENCOUNTER — OFFICE VISIT (OUTPATIENT)
Dept: FAMILY MEDICINE | Facility: CLINIC | Age: 73
End: 2023-01-20
Payer: COMMERCIAL

## 2023-01-20 VITALS
SYSTOLIC BLOOD PRESSURE: 123 MMHG | DIASTOLIC BLOOD PRESSURE: 66 MMHG | BODY MASS INDEX: 34.69 KG/M2 | RESPIRATION RATE: 16 BRPM | HEART RATE: 79 BPM | WEIGHT: 208.2 LBS | OXYGEN SATURATION: 97 % | TEMPERATURE: 97.9 F | HEIGHT: 65 IN

## 2023-01-20 DIAGNOSIS — R73.9 HYPERGLYCEMIA: ICD-10-CM

## 2023-01-20 DIAGNOSIS — Z01.818 PREOP EXAM FOR INTERNAL MEDICINE: Primary | ICD-10-CM

## 2023-01-20 LAB
BASOPHILS # BLD AUTO: 0 10E3/UL (ref 0–0.2)
BASOPHILS NFR BLD AUTO: 0 %
EOSINOPHIL # BLD AUTO: 0.1 10E3/UL (ref 0–0.7)
EOSINOPHIL NFR BLD AUTO: 1 %
ERYTHROCYTE [DISTWIDTH] IN BLOOD BY AUTOMATED COUNT: 12.8 % (ref 10–15)
HBA1C MFR BLD: 6.4 % (ref 0–5.6)
HCT VFR BLD AUTO: 42.7 % (ref 35–47)
HGB BLD-MCNC: 13.8 G/DL (ref 11.7–15.7)
IMM GRANULOCYTES # BLD: 0 10E3/UL
IMM GRANULOCYTES NFR BLD: 0 %
LYMPHOCYTES # BLD AUTO: 2.6 10E3/UL (ref 0.8–5.3)
LYMPHOCYTES NFR BLD AUTO: 36 %
MCH RBC QN AUTO: 28.5 PG (ref 26.5–33)
MCHC RBC AUTO-ENTMCNC: 32.3 G/DL (ref 31.5–36.5)
MCV RBC AUTO: 88 FL (ref 78–100)
MONOCYTES # BLD AUTO: 0.4 10E3/UL (ref 0–1.3)
MONOCYTES NFR BLD AUTO: 6 %
NEUTROPHILS # BLD AUTO: 4 10E3/UL (ref 1.6–8.3)
NEUTROPHILS NFR BLD AUTO: 57 %
PLATELET # BLD AUTO: 248 10E3/UL (ref 150–450)
RBC # BLD AUTO: 4.85 10E6/UL (ref 3.8–5.2)
WBC # BLD AUTO: 7.1 10E3/UL (ref 4–11)

## 2023-01-20 PROCEDURE — 99214 OFFICE O/P EST MOD 30 MIN: CPT | Performed by: INTERNAL MEDICINE

## 2023-01-20 PROCEDURE — 85025 COMPLETE CBC W/AUTO DIFF WBC: CPT | Performed by: INTERNAL MEDICINE

## 2023-01-20 PROCEDURE — 83036 HEMOGLOBIN GLYCOSYLATED A1C: CPT | Performed by: INTERNAL MEDICINE

## 2023-01-20 PROCEDURE — 36415 COLL VENOUS BLD VENIPUNCTURE: CPT | Performed by: INTERNAL MEDICINE

## 2023-01-20 ASSESSMENT — PAIN SCALES - GENERAL: PAINLEVEL: NO PAIN (0)

## 2023-01-20 NOTE — PROGRESS NOTES
[address], [address]  [facility] [address]  Phone: [phone]  Primary Provider: Precious Hay  Pre-op Performing Provider: BERTRAND WRIGHT      PREOPERATIVE EVALUATION:  Today's date: 1/20/2023    Bharath Kennedy is a 72 year old female who presents for a preoperative evaluation.    Surgical Information:  Surgery/Procedure: Right knee replacement  Surgery Location: [facility] [place]  Surgeon: Dr. Cruz  Surgery Date: 2-7-2023  Time of Surgery: tbd  Where patient plans to recover: At home with family  Fax number for surgical facility: [phone]    Type of Anesthesia Anticipated: General    Assessment & Plan     The proposed surgical procedure is considered INTERMEDIATE risk.    Preop exam for internal medicine  Labs as ordered.  If stable proceed as planned.  Per patient request [facility] MRI report is sent to be scanned to her [facility] chart.    - CBC with platelets and differential  - Hemoglobin A1c    Hyperglycemia    - Hemoglobin A1c         Risks and Recommendations:  The patient has the following additional risks and recommendations for perioperative complications:   - No identified additional risk factors other than previously addressed    Medication Instructions:  hold all supplements and NSAID for 5 days prior to surgery    RECOMMENDATION:  APPROVAL GIVEN to proceed with proposed procedure, without further diagnostic evaluation.    Subjective     HPI related to upcoming procedure:   Pt is new to me.  Typically seen elsewhere in the system.  Planning to undergo the above elective procedure    Started with a torn meniscus in April.  She has had steroid injections as well.    No prior anesthesia intolerance.  No personal h/o heart disease.    Daily activities easily tops 4 METS.  Watches a 16 month old regularly.      Prior elevated glucose, last a1c some time ago.      Preop Questions 1/19/2023   1. Have you ever had a heart attack or stroke? No   2. Have  you ever had surgery on your heart or blood vessels, such as a stent placement, a coronary artery bypass, or surgery on an artery in your head, neck, heart, or legs? No   3. Do you have chest pain with activity? No   4. Do you have a history of  heart failure? No   5. Do you currently have a cold, bronchitis or symptoms of other infection? No   6. Do you have a cough, shortness of breath, or wheezing? No   7. Do you or anyone in your family have previous history of blood clots? No   8. Do you or does anyone in your family have a serious bleeding problem such as prolonged bleeding following surgeries or cuts? No   9. Have you ever had problems with anemia or been told to take iron pills? YES - at age 14   10. Have you had any abnormal blood loss such as black, tarry or bloody stools, or abnormal vaginal bleeding? No   11. Have you ever had a blood transfusion? No   12. Are you willing to have a blood transfusion if it is medically needed before, during, or after your surgery? Yes   13. Have you or any of your relatives ever had problems with anesthesia? No   14. Do you have sleep apnea, excessive snoring or daytime drowsiness? No   15. Do you have any artifical heart valves or other implanted medical devices like a pacemaker, defibrillator, or continuous glucose monitor? No   16. Do you have artificial joints? No   17. Are you allergic to latex? No   18. Is there any chance that you may be pregnant? -       Preoperative Review of :   reviewed - no record of controlled substances prescribed.      Review of Systems  Constitutional, neuro, ENT, endocrine, pulmonary, cardiac, gastrointestinal, genitourinary, musculoskeletal, integument and psychiatric systems are negative, except as otherwise noted.    Patient Active Problem List    Diagnosis Date Noted     Moderate persistent asthma without complication 12/14/2016     Priority: Medium     Allergic rhinitis due to dust mite 12/14/2016     Priority: Medium      "Allergic rhinitis due to cat hair 12/14/2016     Priority: Medium     Seasonal allergic rhinitis due to pollen 12/14/2016     Priority: Medium     Major depression in complete remission (H) 01/09/2012     Priority: Medium     Anxiety 06/25/2008     Priority: Medium      Past Medical History:   Diagnosis Date     Acne vulgaris      Depressive disorder      Depressive disorder, not elsewhere classified      Displacement of intervertebral disc, site unspecified, without myelopathy     L5-S1      Moderate persistent asthma without complication 12/14/2016     Obesity, unspecified      Past Surgical History:   Procedure Laterality Date     COLONOSCOPY  01/01/2008    [facility]     EYE SURGERY  2017 and 2018     HC TOOTH EXTRACTION W/FORCEP       LAMINECT/DISCECTOMY, LUMBAR  06/26/2008    Left L5-S1 discectomy for herniated disc & left leg pain.     TONSILLECTOMY       Current Outpatient Medications   Medication Sig Dispense Refill     citalopram (CELEXA) 20 MG tablet Take 1 tablet (20 mg) by mouth daily 90 tablet 3     SUMAtriptan (IMITREX) 50 MG tablet Take 1 tablet (50 mg) by mouth at onset of headache for migraine 18 tablet 3       Allergies   Allergen Reactions     Amoxicillin      fever     Nitrofurantoin      Fever at noc     Penicillins      Rash/itch          Social History     Tobacco Use     Smoking status: Never     Smokeless tobacco: Never     Tobacco comments:     no smokers in household   Substance Use Topics     Alcohol use: No       History   Drug Use No         Objective     /66 (BP Location: Left arm, Patient Position: Sitting, Cuff Size: Adult Large)   Pulse 79   Temp 97.9  F (36.6  C) (Tympanic)   Resp 16   Ht 1.638 m (5' 4.5\")   Wt 94.4 kg (208 lb 3.2 oz)   LMP 01/01/2005   SpO2 97%   BMI 35.19 kg/m      Physical Exam    GENERAL APPEARANCE: healthy, alert and no distress     EYES: EOMI, PERRL     HENT: ear canals and TM's normal and nose and mouth without ulcers or lesions     NECK: no " adenopathy, no asymmetry, masses, or scars and thyroid normal to palpation     RESP: lungs clear to auscultation - no rales, rhonchi or wheezes     CV: regular rates and rhythm, normal S1 S2, no S3 or S4 and no murmur, click or rub     ABDOMEN:  soft, nontender, no HSM or masses and bowel sounds normal     MS: extremities normal- no gross deformities noted, no evidence of inflammation in joints, FROM in all extremities.     SKIN: no suspicious lesions or rashes     NEURO: Normal strength and tone, sensory exam grossly normal, mentation intact and speech normal     PSYCH: mentation appears normal. and affect normal/bright     LYMPHATICS: No cervical adenopathy    Recent Labs   Lab Test 04/04/22  1033 07/08/21  1028    140   POTASSIUM 4.1 4.3   CR 0.67 0.68        Diagnostics:  Labs pending at this time.  Results will be reviewed when available.   No EKG required, no history of coronary heart disease, significant arrhythmia, peripheral arterial disease or other structural heart disease.    Revised Cardiac Risk Index (RCRI):  The patient has the following serious cardiovascular risks for perioperative complications:   - No serious cardiac risks = 0 points     RCRI Interpretation: 0 points: Class I (very low risk - 0.4% complication rate)           Signed Electronically by: Pasquale Baez MD  Copy of this evaluation report is provided to requesting physician.

## 2023-01-20 NOTE — PATIENT INSTRUCTIONS
Blood test to make sure no anemia    We'll check an a1c also    No supplements or Advil or Aleve for the 5 days leading up to surgery.  Tylenol is ok

## 2023-02-07 ENCOUNTER — TRANSFERRED RECORDS (OUTPATIENT)
Dept: HEALTH INFORMATION MANAGEMENT | Facility: CLINIC | Age: 73
End: 2023-02-07
Payer: COMMERCIAL

## 2023-04-11 ENCOUNTER — OFFICE VISIT (OUTPATIENT)
Dept: FAMILY MEDICINE | Facility: OTHER | Age: 73
End: 2023-04-11
Payer: COMMERCIAL

## 2023-04-11 VITALS
OXYGEN SATURATION: 96 % | SYSTOLIC BLOOD PRESSURE: 108 MMHG | HEIGHT: 65 IN | RESPIRATION RATE: 16 BRPM | BODY MASS INDEX: 32.9 KG/M2 | WEIGHT: 197.5 LBS | DIASTOLIC BLOOD PRESSURE: 64 MMHG | HEART RATE: 76 BPM | TEMPERATURE: 98.4 F

## 2023-04-11 DIAGNOSIS — F33.42 RECURRENT MAJOR DEPRESSIVE DISORDER, IN FULL REMISSION (H): ICD-10-CM

## 2023-04-11 DIAGNOSIS — F41.9 ANXIETY: ICD-10-CM

## 2023-04-11 DIAGNOSIS — Z00.00 ENCOUNTER FOR MEDICARE ANNUAL WELLNESS EXAM: Primary | ICD-10-CM

## 2023-04-11 DIAGNOSIS — R51.9 NONINTRACTABLE EPISODIC HEADACHE, UNSPECIFIED HEADACHE TYPE: ICD-10-CM

## 2023-04-11 DIAGNOSIS — D62 ANEMIA DUE TO BLOOD LOSS, ACUTE: ICD-10-CM

## 2023-04-11 DIAGNOSIS — R73.03 PREDIABETES: ICD-10-CM

## 2023-04-11 DIAGNOSIS — Z12.31 VISIT FOR SCREENING MAMMOGRAM: ICD-10-CM

## 2023-04-11 LAB
ERYTHROCYTE [DISTWIDTH] IN BLOOD BY AUTOMATED COUNT: 13.3 % (ref 10–15)
HBA1C MFR BLD: 5.7 % (ref 0–5.6)
HCT VFR BLD AUTO: 42.7 % (ref 35–47)
HGB BLD-MCNC: 13.7 G/DL (ref 11.7–15.7)
MCH RBC QN AUTO: 28.6 PG (ref 26.5–33)
MCHC RBC AUTO-ENTMCNC: 32.1 G/DL (ref 31.5–36.5)
MCV RBC AUTO: 89 FL (ref 78–100)
PLATELET # BLD AUTO: 271 10E3/UL (ref 150–450)
RBC # BLD AUTO: 4.79 10E6/UL (ref 3.8–5.2)
WBC # BLD AUTO: 6.4 10E3/UL (ref 4–11)

## 2023-04-11 PROCEDURE — 85027 COMPLETE CBC AUTOMATED: CPT | Performed by: FAMILY MEDICINE

## 2023-04-11 PROCEDURE — 99214 OFFICE O/P EST MOD 30 MIN: CPT | Mod: 25 | Performed by: FAMILY MEDICINE

## 2023-04-11 PROCEDURE — 36415 COLL VENOUS BLD VENIPUNCTURE: CPT | Performed by: FAMILY MEDICINE

## 2023-04-11 PROCEDURE — G0439 PPPS, SUBSEQ VISIT: HCPCS | Performed by: FAMILY MEDICINE

## 2023-04-11 PROCEDURE — 83036 HEMOGLOBIN GLYCOSYLATED A1C: CPT | Performed by: FAMILY MEDICINE

## 2023-04-11 RX ORDER — CITALOPRAM HYDROBROMIDE 20 MG/1
20 TABLET ORAL DAILY
Qty: 90 TABLET | Refills: 3 | Status: SHIPPED | OUTPATIENT
Start: 2023-04-11 | End: 2024-03-07

## 2023-04-11 RX ORDER — SUMATRIPTAN 50 MG/1
50 TABLET, FILM COATED ORAL
Qty: 18 TABLET | Refills: 3 | Status: CANCELLED | OUTPATIENT
Start: 2023-04-11

## 2023-04-11 ASSESSMENT — ANXIETY QUESTIONNAIRES
GAD7 TOTAL SCORE: 0
3. WORRYING TOO MUCH ABOUT DIFFERENT THINGS: NOT AT ALL
IF YOU CHECKED OFF ANY PROBLEMS ON THIS QUESTIONNAIRE, HOW DIFFICULT HAVE THESE PROBLEMS MADE IT FOR YOU TO DO YOUR WORK, TAKE CARE OF THINGS AT HOME, OR GET ALONG WITH OTHER PEOPLE: NOT DIFFICULT AT ALL
6. BECOMING EASILY ANNOYED OR IRRITABLE: NOT AT ALL
IF YOU CHECKED OFF ANY PROBLEMS ON THIS QUESTIONNAIRE, HOW DIFFICULT HAVE THESE PROBLEMS MADE IT FOR YOU TO DO YOUR WORK, TAKE CARE OF THINGS AT HOME, OR GET ALONG WITH OTHER PEOPLE: NOT DIFFICULT AT ALL
GAD7 TOTAL SCORE: 0
GAD7 TOTAL SCORE: 0
2. NOT BEING ABLE TO STOP OR CONTROL WORRYING: NOT AT ALL
GAD7 TOTAL SCORE: 0
7. FEELING AFRAID AS IF SOMETHING AWFUL MIGHT HAPPEN: NOT AT ALL
8. IF YOU CHECKED OFF ANY PROBLEMS, HOW DIFFICULT HAVE THESE MADE IT FOR YOU TO DO YOUR WORK, TAKE CARE OF THINGS AT HOME, OR GET ALONG WITH OTHER PEOPLE?: NOT DIFFICULT AT ALL
1. FEELING NERVOUS, ANXIOUS, OR ON EDGE: NOT AT ALL
5. BEING SO RESTLESS THAT IT IS HARD TO SIT STILL: NOT AT ALL
4. TROUBLE RELAXING: NOT AT ALL
7. FEELING AFRAID AS IF SOMETHING AWFUL MIGHT HAPPEN: NOT AT ALL
3. WORRYING TOO MUCH ABOUT DIFFERENT THINGS: NOT AT ALL
4. TROUBLE RELAXING: NOT AT ALL
7. FEELING AFRAID AS IF SOMETHING AWFUL MIGHT HAPPEN: NOT AT ALL
2. NOT BEING ABLE TO STOP OR CONTROL WORRYING: NOT AT ALL
1. FEELING NERVOUS, ANXIOUS, OR ON EDGE: NOT AT ALL
5. BEING SO RESTLESS THAT IT IS HARD TO SIT STILL: NOT AT ALL
6. BECOMING EASILY ANNOYED OR IRRITABLE: NOT AT ALL

## 2023-04-11 ASSESSMENT — ENCOUNTER SYMPTOMS
ARTHRALGIAS: 0
COUGH: 0
NERVOUS/ANXIOUS: 0
NAUSEA: 0
DIARRHEA: 0
HEADACHES: 0
ABDOMINAL PAIN: 0
DYSURIA: 0
JOINT SWELLING: 0
HEMATOCHEZIA: 0
PARESTHESIAS: 0
FEVER: 0
FREQUENCY: 0
CONSTIPATION: 0
BREAST MASS: 0
SORE THROAT: 0
HEARTBURN: 0
DIZZINESS: 0
MYALGIAS: 1
WEAKNESS: 0
EYE PAIN: 0
HEMATURIA: 0
SHORTNESS OF BREATH: 0
PALPITATIONS: 0
CHILLS: 0

## 2023-04-11 ASSESSMENT — PATIENT HEALTH QUESTIONNAIRE - PHQ9
10. IF YOU CHECKED OFF ANY PROBLEMS, HOW DIFFICULT HAVE THESE PROBLEMS MADE IT FOR YOU TO DO YOUR WORK, TAKE CARE OF THINGS AT HOME, OR GET ALONG WITH OTHER PEOPLE: NOT DIFFICULT AT ALL
SUM OF ALL RESPONSES TO PHQ QUESTIONS 1-9: 0

## 2023-04-11 ASSESSMENT — ACTIVITIES OF DAILY LIVING (ADL): CURRENT_FUNCTION: NO ASSISTANCE NEEDED

## 2023-04-11 ASSESSMENT — PAIN SCALES - GENERAL: PAINLEVEL: MODERATE PAIN (4)

## 2023-04-11 NOTE — PATIENT INSTRUCTIONS
Patient Education   Personalized Prevention Plan  You are due for the preventive services outlined below.  Your care team is available to assist you in scheduling these services.  If you have already completed any of these items, please share that information with your care team to update in your medical record.  Health Maintenance Due   Topic Date Due     COVID-19 Vaccine (1) Never done     Zoster (Shingles) Vaccine (2 of 3) 05/01/2013     Asthma Action Plan - yearly  12/14/2017     Mammogram  05/08/2021     ANNUAL REVIEW OF HM ORDERS  04/27/2022     Flu Vaccine (1) 09/01/2022     Diptheria Tetanus Pertussis (DTAP/TDAP/TD) Vaccine (3 - Td or Tdap) 03/06/2023

## 2023-04-11 NOTE — PROGRESS NOTES
"SUBJECTIVE:   Bharath is a 72 year old who presents for Preventive Visit.      4/11/2023    10:57 AM   Additional Questions   Roomed by yoly   Accompanied by alone         4/11/2023    10:57 AM   Patient Reported Additional Medications   Patient reports taking the following new medications none     Are you in the first 12 months of your Medicare coverage?  No    Healthy Habits:     In general, how would you rate your overall health?  Good    Frequency of exercise:  6-7 days/week    Duration of exercise:  15-30 minutes    Do you usually eat at least 4 servings of fruit and vegetables a day, include whole grains    & fiber and avoid regularly eating high fat or \"junk\" foods?  Yes    Taking medications regularly:  Yes    Medication side effects:  None    Ability to successfully perform activities of daily living:  No assistance needed    Home Safety:  No safety concerns identified    Hearing Impairment:  No hearing concerns    In the past 6 months, have you been bothered by leaking of urine?  No    In general, how would you rate your overall mental or emotional health?  Good      PHQ-2 Total Score: 0    Additional concerns today:  No      Have you ever done Advance Care Planning? (For example, a Health Directive, POLST, or a discussion with a medical provider or your loved ones about your wishes): No, advance care planning information given to patient to review.  Patient plans to discuss their wishes with loved ones or provider.         Fall risk  Fallen 2 or more times in the past year?: No  Any fall with injury in the past year?: No    Cognitive Screening   1) Repeat 3 items (Leader, Season, Table)    2) Clock draw: NORMAL  3) 3 item recall: Recalls 3 objects  Results: 3 items recalled: COGNITIVE IMPAIRMENT LESS LIKELY    Mini-CogTM Copyright MARY Tuttle. Licensed by the author for use in [facility] [employer]; reprinted with permission (tee@.[facility]). All rights reserved.          Reviewed and updated as needed this " visit by clinical staff   Tobacco  Allergies  Meds  Problems  Med Hx  Surg Hx  Fam Hx          Reviewed and updated as needed this visit by Provider   Tobacco  Allergies  Meds  Problems  Med Hx  Surg Hx  Fam Hx         Social History     Tobacco Use     Smoking status: Never     Smokeless tobacco: Never     Tobacco comments:     no smokers in household   Vaping Use     Vaping status: Never Used     Passive vaping exposure: Yes   Substance Use Topics     Alcohol use: No             4/11/2023    10:57 AM   Alcohol Use   Prescreen: >3 drinks/day or >7 drinks/week? Not Applicable     Do you have a current opioid prescription? No  Do you use any other controlled substances or medications that are not prescribed by a provider? None      Current providers sharing in care for this patient include:   Patient Care Team:  Precious Hay MD as PCP - General  Precious Hay MD as Assigned PCP    The following health maintenance items are reviewed in Epic and correct as of today:  Health Maintenance   Topic Date Due     COVID-19 Vaccine (1) Never done     ZOSTER IMMUNIZATION (2 of 3) 05/01/2013     ASTHMA ACTION PLAN  12/14/2017     MAMMO SCREENING  05/08/2021     ANNUAL REVIEW OF HM ORDERS  04/27/2022     INFLUENZA VACCINE (1) 09/01/2022     DTAP/TDAP/TD IMMUNIZATION (3 - Td or Tdap) 03/06/2023     MEDICARE ANNUAL WELLNESS VISIT  03/25/2023     ASTHMA CONTROL TEST  07/20/2023     FALL RISK ASSESSMENT  04/11/2024     PHQ-9  04/11/2024     COLORECTAL CANCER SCREENING  04/12/2025     ADVANCE CARE PLANNING  03/25/2027     LIPID  04/04/2027     DEXA  05/08/2034     HEPATITIS C SCREENING  Completed     DEPRESSION ACTION PLAN  Completed     Pneumococcal Vaccine: 65+ Years  Completed     IPV IMMUNIZATION  Aged Out     MENINGITIS IMMUNIZATION  Aged Out       Review of Systems   Constitutional: Negative for chills and fever.   HENT: Negative for congestion, ear pain, hearing loss and sore throat.    Eyes:  "Negative for pain and visual disturbance.   Respiratory: Negative for cough and shortness of breath.    Cardiovascular: Positive for peripheral edema (right knee since surgery). Negative for chest pain and palpitations.   Gastrointestinal: Negative for abdominal pain, constipation, diarrhea, heartburn, hematochezia and nausea.   Breasts:  Negative for tenderness, breast mass and discharge.   Genitourinary: Negative for dysuria, frequency, genital sores, hematuria, pelvic pain, urgency, vaginal bleeding and vaginal discharge.   Musculoskeletal: Positive for myalgias. Negative for arthralgias and joint swelling.   Skin: Negative for rash.   Neurological: Negative for dizziness, weakness, headaches and paresthesias.   Psychiatric/Behavioral: Negative for mood changes. The patient is not nervous/anxious.        OBJECTIVE:   /64   Pulse 76   Temp 98.4  F (36.9  C) (Temporal)   Resp 16   Ht 1.641 m (5' 4.61\")   Wt 89.6 kg (197 lb 8 oz)   LMP 01/01/2005   SpO2 96%   BMI 33.27 kg/m   Estimated body mass index is 33.27 kg/m  as calculated from the following:    Height as of this encounter: 1.641 m (5' 4.61\").    Weight as of this encounter: 89.6 kg (197 lb 8 oz).  Physical Exam  Constitutional:       General: She is not in acute distress.     Appearance: She is well-developed.   HENT:      Right Ear: Tympanic membrane and external ear normal.      Left Ear: Tympanic membrane and external ear normal.      Nose: Nose normal.   Eyes:      General:         Right eye: No discharge.         Left eye: No discharge.      Conjunctiva/sclera: Conjunctivae normal.      Pupils: Pupils are equal, round, and reactive to light.   Neck:      Thyroid: No thyroid mass.   Cardiovascular:      Rate and Rhythm: Normal rate and regular rhythm.      Heart sounds: Normal heart sounds, S1 normal and S2 normal. No murmur heard.  Pulmonary:      Effort: Pulmonary effort is normal. No respiratory distress.      Breath sounds: Normal " breath sounds. No wheezing or rales.   Abdominal:      General: Bowel sounds are normal.      Palpations: Abdomen is soft. There is no mass.      Tenderness: There is no abdominal tenderness.   Musculoskeletal:      Cervical back: Neck supple.      Right lower leg: No edema.      Left lower leg: No edema.      Comments: Right knee incision healing well, no induration, redness or dehiscence.   Lymphadenopathy:      Cervical: No cervical adenopathy.   Skin:     General: Skin is warm and dry.      Findings: No rash.   Neurological:      Mental Status: She is alert and oriented to person, place, and time.   Psychiatric:         Thought Content: Thought content normal.         Judgment: Judgment normal.         ASSESSMENT / PLAN:   (Z00.00) Encounter for Medicare annual wellness exam  (primary encounter diagnosis)  Plan: PRIMARY CARE FOLLOW-UP SCHEDULING          (F33.42) Recurrent major depressive disorder, in full remission (H)/(F41.9) Anxiety  Comment: well controlled, she is happy she was on medication as she has been recovering from her knee surgery.  She is not interested in any dose decrease.  Refills were given.    (R51.9) Nonintractable episodic headache, unspecified headache type  Comment: Uses Imitrex rarely but it is effective when she uses it.  She declines refill at this time.    (Z12.31) Visit for screening mammogram  Comment: Last mammogram was 4 years ago.  She did not feel she needed to continue this.  We discussed that she is otherwise healthy and could benefit from any early detection of malignancy.  She is willing to get at least 1 more mammogram before she stops.    (R73.03) Prediabetes  Comment: At her preop she had an elevated A1c of 6.4% and was told that she had diabetes.  We discussed this is actually prediabetes level.  She also had steroid injection for her knees prior to this being done.  Home blood sugars are running in the 120s and 130s.  We will recheck her A1c today.    (D62) Anemia due  "to blood loss, acute  Comment: secondary to surgery, she took iron supplements for 2 months and then stopped, will recheck CBC      COUNSELING:  Reviewed preventive health counseling, as reflected in patient instructions      BMI:   Estimated body mass index is 33.27 kg/m  as calculated from the following:    Height as of this encounter: 1.641 m (5' 4.61\").    Weight as of this encounter: 89.6 kg (197 lb 8 oz).   Weight management plan: Discussed healthy diet and exercise guidelines      She reports that she has never smoked. She has never used smokeless tobacco.      Appropriate preventive services were discussed with this patient, including applicable screening as appropriate for cardiovascular disease, diabetes, osteopenia/osteoporosis, and glaucoma.  As appropriate for age/gender, discussed screening for colorectal cancer, prostate cancer, breast cancer, and cervical cancer. Checklist reviewing preventive services available has been given to the patient.    Reviewed patients plan of care and provided an AVS. The Basic Care Plan (routine screening as documented in Health Maintenance) for Bharath meets the Care Plan requirement. This Care Plan has been established and reviewed with the Patient.      Precious Hay MD  [facility]    Identified Health Risks:    Answers for HPI/ROS submitted by the patient on 4/11/2023  If you checked off any problems, how difficult have these problems made it for you to do your work, take care of things at home, or get along with other people?: Not difficult at all  PHQ9 TOTAL SCORE: 0  SAGE 7 TOTAL SCORE: 0      "

## 2023-06-07 ENCOUNTER — LAB (OUTPATIENT)
Dept: LAB | Facility: OTHER | Age: 73
End: 2023-06-07
Payer: COMMERCIAL

## 2023-06-07 DIAGNOSIS — Z96.659 KNEE JOINT REPLACEMENT STATUS: Primary | ICD-10-CM

## 2023-06-07 LAB
BASOPHILS # BLD AUTO: 0 10E3/UL (ref 0–0.2)
BASOPHILS NFR BLD AUTO: 1 %
CRP SERPL-MCNC: <3 MG/L
D DIMER PPP FEU-MCNC: 1.13 UG/ML FEU (ref 0–0.5)
EOSINOPHIL # BLD AUTO: 0.1 10E3/UL (ref 0–0.7)
EOSINOPHIL NFR BLD AUTO: 1 %
ERYTHROCYTE [DISTWIDTH] IN BLOOD BY AUTOMATED COUNT: 13 % (ref 10–15)
ERYTHROCYTE [SEDIMENTATION RATE] IN BLOOD BY WESTERGREN METHOD: 10 MM/HR (ref 0–30)
HCT VFR BLD AUTO: 43.9 % (ref 35–47)
HGB BLD-MCNC: 14.2 G/DL (ref 11.7–15.7)
IMM GRANULOCYTES # BLD: 0 10E3/UL
IMM GRANULOCYTES NFR BLD: 0 %
LYMPHOCYTES # BLD AUTO: 2.1 10E3/UL (ref 0.8–5.3)
LYMPHOCYTES NFR BLD AUTO: 33 %
MCH RBC QN AUTO: 28.3 PG (ref 26.5–33)
MCHC RBC AUTO-ENTMCNC: 32.3 G/DL (ref 31.5–36.5)
MCV RBC AUTO: 88 FL (ref 78–100)
MONOCYTES # BLD AUTO: 0.3 10E3/UL (ref 0–1.3)
MONOCYTES NFR BLD AUTO: 5 %
NEUTROPHILS # BLD AUTO: 3.9 10E3/UL (ref 1.6–8.3)
NEUTROPHILS NFR BLD AUTO: 60 %
PLATELET # BLD AUTO: 267 10E3/UL (ref 150–450)
RBC # BLD AUTO: 5.01 10E6/UL (ref 3.8–5.2)
WBC # BLD AUTO: 6.4 10E3/UL (ref 4–11)

## 2023-06-07 PROCEDURE — 85025 COMPLETE CBC W/AUTO DIFF WBC: CPT

## 2023-06-07 PROCEDURE — 86140 C-REACTIVE PROTEIN: CPT

## 2023-06-07 PROCEDURE — 36415 COLL VENOUS BLD VENIPUNCTURE: CPT

## 2023-06-07 PROCEDURE — 85652 RBC SED RATE AUTOMATED: CPT

## 2023-06-07 PROCEDURE — 85379 FIBRIN DEGRADATION QUANT: CPT

## 2023-06-08 ENCOUNTER — HOSPITAL ENCOUNTER (EMERGENCY)
Facility: CLINIC | Age: 73
Discharge: HOME | End: 2023-06-08
Attending: EMERGENCY MEDICINE | Admitting: EMERGENCY MEDICINE
Payer: COMMERCIAL

## 2023-06-08 ENCOUNTER — TELEPHONE (OUTPATIENT)
Dept: FAMILY MEDICINE | Facility: OTHER | Age: 73
End: 2023-06-08
Payer: COMMERCIAL

## 2023-06-08 ENCOUNTER — APPOINTMENT (OUTPATIENT)
Dept: ULTRASOUND IMAGING | Facility: CLINIC | Age: 73
End: 2023-06-08
Attending: EMERGENCY MEDICINE
Payer: COMMERCIAL

## 2023-06-08 VITALS
TEMPERATURE: 98 F | SYSTOLIC BLOOD PRESSURE: 123 MMHG | OXYGEN SATURATION: 99 % | DIASTOLIC BLOOD PRESSURE: 62 MMHG | RESPIRATION RATE: 18 BRPM | HEART RATE: 77 BPM

## 2023-06-08 DIAGNOSIS — Z96.651 HISTORY OF TOTAL RIGHT KNEE REPLACEMENT: ICD-10-CM

## 2023-06-08 DIAGNOSIS — M25.461 EFFUSION OF RIGHT KNEE: ICD-10-CM

## 2023-06-08 PROCEDURE — 99283 EMERGENCY DEPT VISIT LOW MDM: CPT | Performed by: EMERGENCY MEDICINE

## 2023-06-08 PROCEDURE — 99284 EMERGENCY DEPT VISIT MOD MDM: CPT | Mod: 25 | Performed by: EMERGENCY MEDICINE

## 2023-06-08 PROCEDURE — 93971 EXTREMITY STUDY: CPT | Mod: RT

## 2023-06-08 ASSESSMENT — ACTIVITIES OF DAILY LIVING (ADL): ADLS_ACUITY_SCORE: 35

## 2023-06-08 NOTE — ED TRIAGE NOTES
Pt had a follow up post surgical RLE and D dimer lab eleavated - sent to r/o blood clot.      Triage Assessment     Row Name 06/08/23 1539       Triage Assessment (Adult)    Airway WDL WDL       Respiratory WDL    Respiratory WDL WDL       Cardiac WDL    Cardiac WDL WDL

## 2023-06-08 NOTE — TELEPHONE ENCOUNTER
TC patient. Can we get more info? Who ordered her D-dimer and other labs and why was it ordered? If she having any leg swelling or other symptoms of a blood clot?    Girma Crockett PA-C       Attending Attestation (For Attendings USE Only)...

## 2023-06-08 NOTE — TELEPHONE ENCOUNTER
Bharath d-dimer is elevated and she is wondering what next steps are.  Please advise.    Yaima Sommer RN  [facility] ~ Registered Nurse  Clinic Triage ~ [place] [facility]  June 8, 2023

## 2023-06-08 NOTE — ED PROVIDER NOTES
History     Chief Complaint   Patient presents with     Leg Swelling     HPI  Bharath Kennedy is a 73 year old female who who presents for right hip joint swelling and pain.  Status post right total knee arthroplasty.  Has been actively using the joint.  Now noted increased swelling along with the discomfort.  No fever, chills.  Is having no calf swelling or tenderness.  Was seen in the clinic by advanced practice provider and they were concerned for possible joint infection.  Part of the work-up included getting a D-dimer which was elevated.  Now presented to the ED for further evaluation to make sure she does not have DVT.    Allergies:  Allergies   Allergen Reactions     Amoxicillin      fever     Nitrofurantoin      Fever at noc     Penicillins      Rash/itch         Problem List:    Patient Active Problem List    Diagnosis Date Noted     Prediabetes 04/11/2023     Priority: Medium     Moderate persistent asthma without complication 12/14/2016     Priority: Medium     Allergic rhinitis due to dust mite 12/14/2016     Priority: Medium     Allergic rhinitis due to cat hair 12/14/2016     Priority: Medium     Seasonal allergic rhinitis due to pollen 12/14/2016     Priority: Medium     Major depression in complete remission (H) 01/09/2012     Priority: Medium     Anxiety 06/25/2008     Priority: Medium        Past Medical History:    Past Medical History:   Diagnosis Date     Acne vulgaris      Depressive disorder      Depressive disorder, not elsewhere classified      Displacement of intervertebral disc, site unspecified, without myelopathy      Moderate persistent asthma without complication 12/14/2016     Obesity, unspecified        Past Surgical History:    Past Surgical History:   Procedure Laterality Date     COLONOSCOPY  01/01/2008    [facility]     EYE SURGERY  2017 and 2018     HC TOOTH EXTRACTION W/FORCEP       LAMINECT/DISCECTOMY, LUMBAR  06/26/2008    Left L5-S1 discectomy for herniated disc & left leg pain.  "    TONSILLECTOMY         Family History:    Family History   Problem Relation Age of Onset     Cancer Mother         breast     C.A.D. Mother         MI in 80s, also had \"hole in ventricule\"     Diabetes Sister         1/2 sister     Depression Sister         2 sisters     Diabetes Sister      Diabetes Maternal Half-Sister        Social History:  Marital Status:   [5]  Social History     Tobacco Use     Smoking status: Never     Smokeless tobacco: Never     Tobacco comments:     no smokers in household   Vaping Use     Vaping status: Never Used     Passive vaping exposure: Yes   Substance Use Topics     Alcohol use: No     Drug use: No        Medications:    citalopram (CELEXA) 20 MG tablet  SUMAtriptan (IMITREX) 50 MG tablet          Review of Systems   All other systems reviewed and are negative.      Physical Exam   BP: 123/62  Pulse: 77  Temp: 98  F (36.7  C)  Resp: 18  SpO2: 99 %      Physical Exam  Vitals and nursing note reviewed.   Constitutional:       Appearance: She is not ill-appearing.   HENT:      Head: Normocephalic.      Nose: Nose normal.   Eyes:      Conjunctiva/sclera: Conjunctivae normal.   Cardiovascular:      Rate and Rhythm: Normal rate.   Pulmonary:      Effort: Pulmonary effort is normal.   Musculoskeletal:      Comments: Right knee:  Moderately large joint effusion  No warmth erythema overlying the joint  No movement with rocking and allowing the patient to move the joint.  Baker's cyst palpable  Incision anterior midline healing well  No signs for cellulitis  Negative Homans test  No calf tenderness with compression  CMS extremity intact   Skin:     General: Skin is warm.      Capillary Refill: Capillary refill takes less than 2 seconds.      Findings: No rash.   Neurological:      General: No focal deficit present.      Mental Status: She is alert and oriented to person, place, and time.   Psychiatric:         Mood and Affect: Mood normal.         Behavior: Behavior normal. "         ED Course                 Procedures                  Results for orders placed or performed during the hospital encounter of 06/08/23 (from the past 24 hour(s))   US Lower Extremity Venous Duplex Right    Narrative    EXAM: US LOWER EXTREMITY VENOUS DUPLEX RIGHT  LOCATION: [facility]  DATE/TIME: 6/8/2023 4:31 PM CDT    INDICATION: Evaluation DVT  COMPARISON: None.  TECHNIQUE: Venous Duplex ultrasound of the right lower extremity with and without compression, augmentation and duplex. Color flow and spectral Doppler with waveform analysis performed.    FINDINGS: Exam includes the common femoral, femoral, popliteal, and contralateral common femoral veins as well as segmentally visualized deep calf veins and greater saphenous vein.     RIGHT: No deep vein thrombosis. No superficial thrombophlebitis. Hypoechoic collection noted at the medial aspect of the right knee measuring approximately 5.9 x 4 x 0.9 cm. This is of uncertain etiology, may be partial visualization of a knee effusion.      Impression    IMPRESSION:  1.  No deep venous thrombosis in the right lower extremity.  2.  Partial visualization of hypoechoic collection of the medial aspect of the knee, may be partial visualization of a knee effusion, among other etiologies.       Medications - No data to display    Assessments & Plan (with Medical Decision Making)  73-year-old female.  Status post right total knee arthroplasty.  Approximately 4 months post procedure.  Very active.  Doing well.  Now developing a joint effusion.  Examination is not in any way consistent with a septic arthritis.  Her joint effusion most likely is due to increased activity which is now restricting her range of motion.  She did see a provider in the clinic who completed evaluation and thought this might be infection related.  Is scheduled to see her Ortho specialist on Wednesday of next week.  I reassured her that her exam  is not consistent  with infection.  Given that she had a D-dimer that was elevated in clinic for thoroughness we did do a ultrasound of the leg and there is no DVT.  I talked the patient about treatment options.  They might simply just allow her to rest ice aggressively allow the fluid to be reabsorbed on its own.  They could also consider completing a arthrocentesis carefully aseptically to avoid any introduction of infection drain the fluid and then introduce cortisone.     I have reviewed the nursing notes.    I have reviewed the findings, diagnosis, plan and need for follow up with the patient.          New Prescriptions    No medications on file       Final diagnoses:   History of total right knee replacement   Effusion of right knee       6/8/2023   [facility] EMERGENCY DEPT     Jb Rod,   06/08/23 3021

## 2023-06-08 NOTE — TELEPHONE ENCOUNTER
"Upon review of chart labs were ordered by RENEE Prasad at [facility].    Patient had total knee on 2/7/23. Went in to see Aubrie because of continued swelling, loss of ROM, severe pain. Aubrie was concerned for infection and ordered labs for work up of this. Patient reports Dr. Murcia (?) was going to be aspirating fluid on Wednesday next week.    Patient says her knee is swollen. It was very hot to the touch yesterday, not has hot today. Aubrie did not want patient to overexert herself so she has been \"laying low\" yesterday and today.     Patient is concerned for a clot with the elevated D-dimer. She has not heard back from [facility] regarding these labs.     Called [facility] and had to leave a message with care coordinator to see if provider had a plan regarding these labs. Instructed to call clinic back and speak with triage nurse.     Does covering provider for [facility] recommend patient be evaluated for DVT now, or wait for [facility] to call back?    Kelly HAMN, RN  [facility]    "

## 2023-06-08 NOTE — TELEPHONE ENCOUNTER
Covering provider JULIO CESAR reviewed and recommended evaluation in emergency department.   Spoke with patient and she agreed to this plan. Will go to a [place] emergency department but unsure which one at this time - will be calling her daughter.     No further questions.     Kelly HAMN, RN  [facility]

## 2023-08-02 ENCOUNTER — TELEPHONE (OUTPATIENT)
Dept: FAMILY MEDICINE | Facility: OTHER | Age: 73
End: 2023-08-02
Payer: COMMERCIAL

## 2023-08-02 NOTE — TELEPHONE ENCOUNTER
Reason for Call:  Appointment Request    Patient requesting this type of appt:  sore that isn't healing     Requested provider: Precious Hay    Reason patient unable to be scheduled: Not within requested timeframe    When does patient want to be seen/preferred time:  as soon as possible    Comments: pt is looking to be worked into a appointment with pcp for a sore that isn't healing on cheek     Could we send this information to you in [facility] or would you prefer to receive a phone call?:   Patient would prefer a phone call   Okay to leave a detailed message?: Yes at Home number on file [phone] (home)    Call taken on 8/2/2023 at 1:07 PM by Digna Hardy

## 2023-08-02 NOTE — TELEPHONE ENCOUNTER
Sounds like this needs to be seen sooner than when [facility] is back. Please work into clinic schedule for another provider.     Madonna Hicks PA-C

## 2023-09-05 ENCOUNTER — LAB (OUTPATIENT)
Dept: LAB | Facility: CLINIC | Age: 73
End: 2023-09-05
Payer: COMMERCIAL

## 2023-09-05 DIAGNOSIS — Z96.659 S/P TOTAL KNEE ARTHROPLASTY: Primary | ICD-10-CM

## 2023-09-05 LAB
BASOPHILS # BLD AUTO: 0 10E3/UL (ref 0–0.2)
BASOPHILS NFR BLD AUTO: 0 %
CRP SERPL-MCNC: <3 MG/L
EOSINOPHIL # BLD AUTO: 0.1 10E3/UL (ref 0–0.7)
EOSINOPHIL NFR BLD AUTO: 1 %
ERYTHROCYTE [DISTWIDTH] IN BLOOD BY AUTOMATED COUNT: 13.2 % (ref 10–15)
ERYTHROCYTE [SEDIMENTATION RATE] IN BLOOD BY WESTERGREN METHOD: 12 MM/HR (ref 0–30)
HCT VFR BLD AUTO: 41.8 % (ref 35–47)
HGB BLD-MCNC: 14.1 G/DL (ref 11.7–15.7)
IMM GRANULOCYTES # BLD: 0 10E3/UL
IMM GRANULOCYTES NFR BLD: 0 %
LYMPHOCYTES # BLD AUTO: 2.4 10E3/UL (ref 0.8–5.3)
LYMPHOCYTES NFR BLD AUTO: 32 %
MCH RBC QN AUTO: 28.8 PG (ref 26.5–33)
MCHC RBC AUTO-ENTMCNC: 33.7 G/DL (ref 31.5–36.5)
MCV RBC AUTO: 86 FL (ref 78–100)
MONOCYTES # BLD AUTO: 0.5 10E3/UL (ref 0–1.3)
MONOCYTES NFR BLD AUTO: 6 %
NEUTROPHILS # BLD AUTO: 4.5 10E3/UL (ref 1.6–8.3)
NEUTROPHILS NFR BLD AUTO: 61 %
NRBC # BLD AUTO: 0 10E3/UL
NRBC BLD AUTO-RTO: 0 /100
PLATELET # BLD AUTO: 240 10E3/UL (ref 150–450)
RBC # BLD AUTO: 4.89 10E6/UL (ref 3.8–5.2)
WBC # BLD AUTO: 7.5 10E3/UL (ref 4–11)

## 2023-09-05 PROCEDURE — 36415 COLL VENOUS BLD VENIPUNCTURE: CPT

## 2023-09-05 PROCEDURE — 85025 COMPLETE CBC W/AUTO DIFF WBC: CPT

## 2023-09-05 PROCEDURE — 85652 RBC SED RATE AUTOMATED: CPT

## 2023-09-05 PROCEDURE — 86140 C-REACTIVE PROTEIN: CPT

## 2023-10-28 ENCOUNTER — HEALTH MAINTENANCE LETTER (OUTPATIENT)
Age: 73
End: 2023-10-28

## 2024-01-05 ENCOUNTER — TRANSFERRED RECORDS (OUTPATIENT)
Dept: MULTI SPECIALTY CLINIC | Facility: CLINIC | Age: 74
End: 2024-01-05

## 2024-01-05 LAB — RETINOPATHY: NORMAL

## 2024-03-06 DIAGNOSIS — F41.9 ANXIETY: ICD-10-CM

## 2024-03-06 DIAGNOSIS — F33.42 RECURRENT MAJOR DEPRESSIVE DISORDER, IN FULL REMISSION (H): ICD-10-CM

## 2024-03-07 RX ORDER — CITALOPRAM HYDROBROMIDE 20 MG/1
20 TABLET ORAL DAILY
Qty: 90 TABLET | Refills: 0 | Status: SHIPPED | OUTPATIENT
Start: 2024-03-07 | End: 2024-03-13

## 2024-03-13 ENCOUNTER — OFFICE VISIT (OUTPATIENT)
Dept: FAMILY MEDICINE | Facility: OTHER | Age: 74
End: 2024-03-13
Payer: COMMERCIAL

## 2024-03-13 VITALS
TEMPERATURE: 97.3 F | OXYGEN SATURATION: 97 % | SYSTOLIC BLOOD PRESSURE: 128 MMHG | DIASTOLIC BLOOD PRESSURE: 64 MMHG | HEART RATE: 73 BPM | RESPIRATION RATE: 18 BRPM

## 2024-03-13 DIAGNOSIS — Z12.31 VISIT FOR SCREENING MAMMOGRAM: ICD-10-CM

## 2024-03-13 DIAGNOSIS — D17.30 LIPOMA OF SKIN AND SUBCUTANEOUS TISSUE: Primary | ICD-10-CM

## 2024-03-13 DIAGNOSIS — F33.42 RECURRENT MAJOR DEPRESSIVE DISORDER, IN FULL REMISSION (H): ICD-10-CM

## 2024-03-13 DIAGNOSIS — F41.9 ANXIETY: ICD-10-CM

## 2024-03-13 PROCEDURE — 99214 OFFICE O/P EST MOD 30 MIN: CPT | Performed by: FAMILY MEDICINE

## 2024-03-13 RX ORDER — CITALOPRAM HYDROBROMIDE 20 MG/1
20 TABLET ORAL DAILY
Qty: 90 TABLET | Refills: 3 | Status: SHIPPED | OUTPATIENT
Start: 2024-03-13 | End: 2024-06-11

## 2024-03-13 RX ORDER — RESPIRATORY SYNCYTIAL VIRUS VACCINE 120MCG/0.5
0.5 KIT INTRAMUSCULAR ONCE
Qty: 1 EACH | Refills: 0 | Status: CANCELLED | OUTPATIENT
Start: 2024-03-13 | End: 2024-03-13

## 2024-03-13 ASSESSMENT — PAIN SCALES - GENERAL: PAINLEVEL: NO PAIN (0)

## 2024-03-13 ASSESSMENT — ASTHMA QUESTIONNAIRES
QUESTION_4 LAST FOUR WEEKS HOW OFTEN HAVE YOU USED YOUR RESCUE INHALER OR NEBULIZER MEDICATION (SUCH AS ALBUTEROL): NOT AT ALL
QUESTION_3 LAST FOUR WEEKS HOW OFTEN DID YOUR ASTHMA SYMPTOMS (WHEEZING, COUGHING, SHORTNESS OF BREATH, CHEST TIGHTNESS OR PAIN) WAKE YOU UP AT NIGHT OR EARLIER THAN USUAL IN THE MORNING: ONCE OR TWICE
ACT_TOTALSCORE: 23
QUESTION_2 LAST FOUR WEEKS HOW OFTEN HAVE YOU HAD SHORTNESS OF BREATH: NOT AT ALL
ACT_TOTALSCORE: 23
QUESTION_1 LAST FOUR WEEKS HOW MUCH OF THE TIME DID YOUR ASTHMA KEEP YOU FROM GETTING AS MUCH DONE AT WORK, SCHOOL OR AT HOME: NONE OF THE TIME
QUESTION_5 LAST FOUR WEEKS HOW WOULD YOU RATE YOUR ASTHMA CONTROL: WELL CONTROLLED

## 2024-03-13 ASSESSMENT — PATIENT HEALTH QUESTIONNAIRE - PHQ9
SUM OF ALL RESPONSES TO PHQ QUESTIONS 1-9: 3
10. IF YOU CHECKED OFF ANY PROBLEMS, HOW DIFFICULT HAVE THESE PROBLEMS MADE IT FOR YOU TO DO YOUR WORK, TAKE CARE OF THINGS AT HOME, OR GET ALONG WITH OTHER PEOPLE: NOT DIFFICULT AT ALL
SUM OF ALL RESPONSES TO PHQ QUESTIONS 1-9: 3

## 2024-03-13 NOTE — PROGRESS NOTES
Assessment & Plan     Lipoma of skin and subcutaneous tissue  I suspect that this is a lipoma.  It does feel separate from her breast tissue but will obtain an ultrasound to make sure there is no worrisome features.    - US Soft Tissue Chest Wall or Upper Back; Future    Recurrent major depressive disorder, in full remission (H24)/Anxiety  Well-controlled with Celexa.  Refills given.    - citalopram (CELEXA) 20 MG tablet; Take 1 tablet (20 mg) by mouth daily    Visit for screening mammogram  She is due for screening mammogram.    - MA Screen Bilateral w/Marlo; Future    Subjective   Bharath is a 73 year old, presenting for the following health issues:  Lump under armpit      3/13/2024    10:17 AM   Additional Questions   Roomed by Constance bolton   Accompanied by Self     History of Present Illness       Reason for visit:  Lump  Symptom onset:  3-7 days ago  Symptoms include:  Lump  Symptom intensity:  Mild    She eats 0-1 servings of fruits and vegetables daily.She consumes 1 sweetened beverage(s) daily.She exercises with enough effort to increase her heart rate 9 or less minutes per day.  She exercises with enough effort to increase her heart rate 3 or less days per week.   She is taking medications regularly.     Patient noticed a lump on her right lateral chest wall a few inches below her axilla.  Denies pain.  Denies discharge.  Just has not noticed this before.  She denies any breast lumps, nipple discharge or breast pain.    Objective    /64   Pulse 73   Temp 97.3  F (36.3  C) (Temporal)   Resp 18   LMP 01/01/2005   SpO2 97%   There is no height or weight on file to calculate BMI.  Physical Exam   Gen: no apparent distress  Chest wall: There is an oblong 2-1/2 inch tubular soft subcutaneous mass which appears very separate from breast tissue.  It is nontender.  It is mobile.  Right breast: No breast mass, no axillary adenopathy, no nipple discharge, no skin changes          Signed Electronically by: Precious  EPIFANIO Hay MD

## 2024-03-14 ENCOUNTER — HOSPITAL ENCOUNTER (OUTPATIENT)
Dept: MAMMOGRAPHY | Facility: CLINIC | Age: 74
Discharge: HOME | End: 2024-03-14
Attending: FAMILY MEDICINE
Payer: COMMERCIAL

## 2024-03-14 ENCOUNTER — HOSPITAL ENCOUNTER (OUTPATIENT)
Dept: ULTRASOUND IMAGING | Facility: CLINIC | Age: 74
Discharge: HOME | End: 2024-03-14
Attending: FAMILY MEDICINE
Payer: COMMERCIAL

## 2024-03-14 DIAGNOSIS — D17.30 LIPOMA OF SKIN AND SUBCUTANEOUS TISSUE: ICD-10-CM

## 2024-03-14 DIAGNOSIS — Z12.31 VISIT FOR SCREENING MAMMOGRAM: ICD-10-CM

## 2024-03-14 PROCEDURE — 76604 US EXAM CHEST: CPT | Mod: 52

## 2024-03-14 PROCEDURE — 77063 BREAST TOMOSYNTHESIS BI: CPT

## 2024-03-30 ENCOUNTER — NURSE TRIAGE (OUTPATIENT)
Dept: NURSING | Facility: CLINIC | Age: 74
End: 2024-03-30
Payer: COMMERCIAL

## 2024-03-30 NOTE — TELEPHONE ENCOUNTER
"[place]: Dr Precious Hay  Son: Vadim tested positive today (age 44).   COVID test done @ home today. Unsure if it is positive. She has a cough, \"I always have a cough, I have asthma\". She has not been vaccinated.   Advised to repeat test. Hx of obesity and asthma.   Repeated test during this call = negative result.     Home care information given per guideline. She was mostly concerned because she had been with  grandchild on Thursday. Son did not have direct contact with baby. Advised to have daughter call oncall provider if concerned. Baby is not showing any sx of illness at this time.     Paola Demarco RN Triage Nurse Advisor 3:56 PM 3/30/2024    Reason for Disposition   [1] COVID-19 EXPOSURE within last 14 days AND [2] NO symptoms    Additional Information   Negative: COVID-19 lab test positive   Negative: [1] Lives with someone known to have influenza (flu test positive) AND [2] flu-like symptoms (e.g., cough, runny nose, sore throat, SOB; with or without fever)   Negative: [1] Symptoms of COVID-19 (e.g., cough, fever, SOB, or others) AND [2] doctor (or NP/PA) diagnosed COVID-19 based on symptoms   Negative: [1] Symptoms of COVID-19 (e.g., cough, fever, SOB, or others) AND [2] within 14 days of COVID-19 EXPOSURE   Negative: [1] Symptoms of COVID-19 (e.g., cough, fever, SOB, or others) AND [2] within 14 days of being in a high risk area for COVID-19 community spread (identified by CDC)   Negative: [1] Difficulty breathing (shortness of breath) occurs AND [2] onset > 14 days after COVID-19 EXPOSURE   Negative: [1] Dry cough occurs AND [2] onset > 14 days after COVID-19 EXPOSURE   Negative: [1] Wet cough (i.e., white-yellow, yellow, green, or ashley colored sputum) AND [2] onset > 14 days after COVID-19 EXPOSURE   Negative: [1] Common cold symptoms AND [2] onset > 14 days after COVID-19 EXPOSURE   Negative: COVID-19 vaccine reaction suspected (e.g., fever, headache, muscle aches) occurring during " OD: 10-0 Nylon suture and acrylic lens. days 1 to 3 after getting vaccine   Negative: COVID-19 vaccine, questions about   Negative: [1] COVID-19 EXPOSURE within last 14 days AND [2] requests COVID-19 lab test to return to work AND [3] NO symptoms   Negative: [1] COVID-19 EXPOSURE within last 14 days AND [2] weak immune system (e.g., HIV positive, cancer chemo, splenectomy, organ transplant, chronic steroids) AND [3] NO symptoms   Negative: [1] Living or working in a correctional facility, long-term care facility, or shelter (i.e., group setting; densely populated) AND [2] where an outbreak has occurred AND [3] NO symptoms    Protocols used: Coronavirus (COVID-19) Exposure-A-AH

## 2024-04-01 ENCOUNTER — TELEPHONE (OUTPATIENT)
Dept: FAMILY MEDICINE | Facility: OTHER | Age: 74
End: 2024-04-01
Payer: COMMERCIAL

## 2024-04-01 DIAGNOSIS — U07.1 INFECTION DUE TO 2019 NOVEL CORONAVIRUS: Primary | ICD-10-CM

## 2024-04-01 NOTE — TELEPHONE ENCOUNTER
RN COVID TREATMENT VISIT  04/01/24      The patient has been triaged and does not require a higher level of care.    Bharath Kennedy  73 year old  Current weight? 200    Has the patient been seen by a primary care provider at an [facility] or [facility] within the past two years? Yes.   Have you been in close proximity to/do you have a known exposure to a person with a confirmed case of influenza? No.     General treatment eligibility:  Date of positive COVID test (PCR or at home)?  4/1/24    Are you or have you been hospitalized for this COVID-19 infection? No.   Have you received monoclonal antibodies or antiviral treatment for COVID-19 since this positive test? No.   Do you have any of the following conditions that place you at risk of being very sick from COVID-19?   - Age 50 years or older  - Chronic lung diseases such as asthma, bronchiectasis, COPD, interstitial lung disease, pulmonary embolism, pulmonary hypertension   Yes, patient has at least one high risk condition as noted above.     Current COVID symptoms:   - cough  - shortness of breath or difficulty breathing  - fatigue  - muscle or body aches  Yes. Patient has at least one symptom as selected.     How many days since symptoms started? 5 days or less. Established patient, 12 years or older weighing at least 88.2 lbs, who has symptoms that started in the past 5 days, has not been hospitalized nor received treatment already, and is at risk for being very sick from COVID-19.     Treatment eligibility by RN:  Are you currently pregnant or nursing? No  Do you have a clinically significant hypersensitivity to nirmatrelvir or ritonavir, or toxic epidermal necrolysis (TEN) or Keenan-Marcel Syndrome? No  Do you have a history of hepatitis, any hepatic impairment on the Problem List (such as Child-Bustamante Class C, cirrhosis, fatty liver disease, alcoholic liver disease), or was the last liver lab (hepatic panel, ALT, AST, ALK Phos,  bilirubin) elevated in the past 6 months? No  Do you have any history of severe renal impairment (eGFR < 30mL/min)? No    Is patient eligible to continue? Yes, patient meets all eligibility requirements for the RN COVID treatment (as denoted by all no responses above).     Current Outpatient Medications   Medication Sig Dispense Refill    citalopram (CELEXA) 20 MG tablet Take 1 tablet (20 mg) by mouth daily 90 tablet 3    SUMAtriptan (IMITREX) 50 MG tablet Take 1 tablet (50 mg) by mouth at onset of headache for migraine (Patient not taking: Reported on 3/13/2024) 18 tablet 3       Medications from List 1 of the standing order (on medications that exclude the use of Paxlovid) that patient is taking: NONE. Is patient taking Artemio's Wort? No  Is patient taking Artemio's Wort or any meds from List 1? No.   Medications from List 2 of the standing order (on meds that provider needs to adjust) that patient is taking: NONE. Is patient on any of the meds from List 2? No.   Medications from List 3 of standing order (on meds that a RN needs to adjust) that patient is taking: NONE. Is patient on any meds from List 3? No.     Paxlovid has an approximate 90% reduction in hospitalization. Paxlovid can possibly cause altered sense of taste, diarrhea (loose, watery stools), high blood pressure, muscle aches.     Would patient like a Paxlovid prescription?   Yes.   Lab Results   Component Value Date    GFRESTIMATED >90 04/04/2022       Was last eGFR reduced? No, eGFR 60 or greater/ No Result on record. Patient can receive the normal renal function dose. Paxlovid Rx sent to [place] pharmacy   [facility]    Temporary change to home medications: None    All medication adjustments (holds, etc) were discussed with the patient and patient was asked to repeat back (teachback) their med adjustment.  Did patient understand med adjustment? No medication adjustments needed.         Reviewed the following instructions with the  patient:    Paxlovid (nimatrelvir and ritonavir)    How it works  Two medicines (nirmatrelvir and ritonavir) are taken together. They stop the virus from growing. Less amount of virus is easier for your body to fight.    How to take  Medicine comes in a daily container with both medicine tablets. Take by mouth twice daily (once in the morning, once at night) for 5 days.  The number of tablets to take varies by patient.  Don't chew or break capsules. Swallow whole.    When to take  Take as soon as possible after positive COVID-19 test result, and within 5 days of your first symptoms.    Possible side effects  Can cause altered sense of taste, diarrhea (loose, watery stools), high blood pressure, muscle aches.    Kelly Hanson RN

## 2024-04-06 ENCOUNTER — NURSE TRIAGE (OUTPATIENT)
Dept: NURSING | Facility: CLINIC | Age: 74
End: 2024-04-06
Payer: COMMERCIAL

## 2024-04-07 NOTE — TELEPHONE ENCOUNTER
Pt is calling with concerns of testing positive for Covid since Monday.  Pt had severe diarrhea 3 days ago, felt better and it is still not going away. There is pus, severe back ache, unable to eat. Eating bread, rice, milk  pt is taking tylenol for back pain  Diarrhea 4x within last 24 hours  Pt is concerned about pus in stools and pain. Pt stopped all supplements.  starts from above tailbone, Back pain Lower radiates to left leg,. Feels like sciatica nerve pain. Denies problem with bowel/bladder.    Triage to see HCP within 24 hours, care advice given.    Ketan Loja, RN, BSN  4/6/2024 at 8:46 PM  [facility]       Reason for Disposition   [1] MODERATE diarrhea (e.g., 4-6 times / day more than normal) AND [2] present > 48 hours (2 days)    Additional Information   Negative: Shock suspected (e.g., cold/pale/clammy skin, too weak to stand, low BP, rapid pulse)   Negative: Difficult to awaken or acting confused (e.g., disoriented, slurred speech)   Negative: Sounds like a life-threatening emergency to the triager   Negative: [1] SEVERE abdominal pain (e.g., excruciating) AND [2] present > 1 hour   Negative: [1] SEVERE abdominal pain AND [2] age > 60 years   Negative: [1] Blood in the stool AND [2] moderate or large amount of blood   Negative: Black or tarry bowel movements  (Exception: Chronic-unchanged black-grey BMs AND is taking iron pills or Pepto-Bismol.)   Negative: [1] Drinking very little AND [2] dehydration suspected (e.g., no urine > 12 hours, very dry mouth, very lightheaded)   Negative: Patient sounds very sick or weak to the triager   Negative: [1] SEVERE diarrhea (e.g., 7 or more times / day more than normal) AND [2] age > 60 years   Negative: [1] Constant abdominal pain AND [2] present > 2 hours   Negative: [1] Fever > 103 F (39.4 C) AND [2] not able to get the fever down using Fever Care Advice   Negative: [1] SEVERE diarrhea (e.g., 7 or more times / day more than normal) AND [2] present  > 24 hours (1 day)    Protocols used: Diarrhea-A-AH

## 2024-04-10 ENCOUNTER — TELEPHONE (OUTPATIENT)
Dept: FAMILY MEDICINE | Facility: OTHER | Age: 74
End: 2024-04-10
Payer: COMMERCIAL

## 2024-04-10 NOTE — TELEPHONE ENCOUNTER
Called patient and [facility] is now closed. Denies red flags symptoms and will follow up in UC/ED if worsens.    Appointments in Next Year      Apr 11, 2024  4:00 PM  (Arrive by 3:40 PM)  Provider Visit with ANASTASIYA Mckeon CNP  [facility] ([facility] ) [phone]     Xiomara Katz RN  [facility] - Registered Nurse  Clinic Triage [place]   April 10, 2024

## 2024-04-10 NOTE — TELEPHONE ENCOUNTER
Patient calling about her diarrhea and back pain. See triage encounter 4/6/24 - disposition was to see HCP within 24 hours.     Patient is currently scheduled to see PCP on 4/16/24.   She continues to have same symptoms of diarrhea, back pain, left abdominal pain, and pus in stool.   She is asking if PCP could order a stool sample prior to appointment on 4/16/24. Informed patient that visit is usually needed prior to tests being ordered. Offered sooner appointment with an alternate provider to be seen tomorrow. Patient declined, would like PCP to review message first if stool samples can be ordered.     Kelly HAMN, RN

## 2024-04-11 ENCOUNTER — OFFICE VISIT (OUTPATIENT)
Dept: FAMILY MEDICINE | Facility: OTHER | Age: 74
End: 2024-04-11
Payer: COMMERCIAL

## 2024-04-11 VITALS
BODY MASS INDEX: 35.59 KG/M2 | SYSTOLIC BLOOD PRESSURE: 112 MMHG | OXYGEN SATURATION: 95 % | WEIGHT: 208.5 LBS | HEART RATE: 79 BPM | HEIGHT: 64 IN | TEMPERATURE: 98.3 F | DIASTOLIC BLOOD PRESSURE: 72 MMHG | RESPIRATION RATE: 22 BRPM

## 2024-04-11 DIAGNOSIS — R10.32 LLQ ABDOMINAL PAIN: ICD-10-CM

## 2024-04-11 DIAGNOSIS — E66.812 CLASS 2 SEVERE OBESITY DUE TO EXCESS CALORIES WITH SERIOUS COMORBIDITY AND BODY MASS INDEX (BMI) OF 35.0 TO 35.9 IN ADULT (H): ICD-10-CM

## 2024-04-11 DIAGNOSIS — F41.9 ANXIETY: ICD-10-CM

## 2024-04-11 DIAGNOSIS — R19.7 DIARRHEA, UNSPECIFIED TYPE: Primary | ICD-10-CM

## 2024-04-11 DIAGNOSIS — E66.01 CLASS 2 SEVERE OBESITY DUE TO EXCESS CALORIES WITH SERIOUS COMORBIDITY AND BODY MASS INDEX (BMI) OF 35.0 TO 35.9 IN ADULT (H): ICD-10-CM

## 2024-04-11 DIAGNOSIS — R73.03 PREDIABETES: ICD-10-CM

## 2024-04-11 DIAGNOSIS — E11.9 TYPE 2 DIABETES MELLITUS WITHOUT COMPLICATION, WITHOUT LONG-TERM CURRENT USE OF INSULIN (H): ICD-10-CM

## 2024-04-11 LAB
ALBUMIN SERPL BCG-MCNC: 4.3 G/DL (ref 3.5–5.2)
ALP SERPL-CCNC: 110 U/L (ref 40–150)
ALT SERPL W P-5'-P-CCNC: 60 U/L (ref 0–50)
ANION GAP SERPL CALCULATED.3IONS-SCNC: 11 MMOL/L (ref 7–15)
AST SERPL W P-5'-P-CCNC: 41 U/L (ref 0–45)
BASOPHILS # BLD AUTO: 0 10E3/UL (ref 0–0.2)
BASOPHILS NFR BLD AUTO: 0 %
BILIRUB SERPL-MCNC: 0.5 MG/DL
BUN SERPL-MCNC: 8.2 MG/DL (ref 8–23)
CALCIUM SERPL-MCNC: 9.6 MG/DL (ref 8.8–10.2)
CHLORIDE SERPL-SCNC: 104 MMOL/L (ref 98–107)
CREAT SERPL-MCNC: 0.77 MG/DL (ref 0.51–0.95)
DEPRECATED HCO3 PLAS-SCNC: 25 MMOL/L (ref 22–29)
EGFRCR SERPLBLD CKD-EPI 2021: 82 ML/MIN/1.73M2
EOSINOPHIL # BLD AUTO: 0 10E3/UL (ref 0–0.7)
EOSINOPHIL NFR BLD AUTO: 0 %
ERYTHROCYTE [DISTWIDTH] IN BLOOD BY AUTOMATED COUNT: 12.8 % (ref 10–15)
GLUCOSE SERPL-MCNC: 90 MG/DL (ref 70–99)
HBA1C MFR BLD: 7.2 % (ref 0–5.6)
HCT VFR BLD AUTO: 41.4 % (ref 35–47)
HGB BLD-MCNC: 13.9 G/DL (ref 11.7–15.7)
IMM GRANULOCYTES # BLD: 0 10E3/UL
IMM GRANULOCYTES NFR BLD: 0 %
LYMPHOCYTES # BLD AUTO: 2 10E3/UL (ref 0.8–5.3)
LYMPHOCYTES NFR BLD AUTO: 32 %
MCH RBC QN AUTO: 28.7 PG (ref 26.5–33)
MCHC RBC AUTO-ENTMCNC: 33.6 G/DL (ref 31.5–36.5)
MCV RBC AUTO: 85 FL (ref 78–100)
MONOCYTES # BLD AUTO: 0.4 10E3/UL (ref 0–1.3)
MONOCYTES NFR BLD AUTO: 7 %
NEUTROPHILS # BLD AUTO: 3.9 10E3/UL (ref 1.6–8.3)
NEUTROPHILS NFR BLD AUTO: 61 %
PLATELET # BLD AUTO: 248 10E3/UL (ref 150–450)
POTASSIUM SERPL-SCNC: 4.3 MMOL/L (ref 3.4–5.3)
PROT SERPL-MCNC: 7.1 G/DL (ref 6.4–8.3)
RBC # BLD AUTO: 4.85 10E6/UL (ref 3.8–5.2)
SODIUM SERPL-SCNC: 140 MMOL/L (ref 135–145)
WBC # BLD AUTO: 6.3 10E3/UL (ref 4–11)

## 2024-04-11 PROCEDURE — 85025 COMPLETE CBC W/AUTO DIFF WBC: CPT

## 2024-04-11 PROCEDURE — 80053 COMPREHEN METABOLIC PANEL: CPT

## 2024-04-11 PROCEDURE — 83036 HEMOGLOBIN GLYCOSYLATED A1C: CPT

## 2024-04-11 PROCEDURE — 36415 COLL VENOUS BLD VENIPUNCTURE: CPT

## 2024-04-11 PROCEDURE — 99214 OFFICE O/P EST MOD 30 MIN: CPT

## 2024-04-11 RX ORDER — METFORMIN HCL 500 MG
TABLET, EXTENDED RELEASE 24 HR ORAL
Qty: 162 TABLET | Refills: 0 | Status: SHIPPED | OUTPATIENT
Start: 2024-04-11 | End: 2024-04-12

## 2024-04-11 ASSESSMENT — ENCOUNTER SYMPTOMS: DIARRHEA: 1

## 2024-04-11 ASSESSMENT — PAIN SCALES - GENERAL: PAINLEVEL: MILD PAIN (3)

## 2024-04-11 NOTE — PATIENT INSTRUCTIONS
I talked to a provider [facility] in [place], [place]. Please arrive at 1030 tomorrow for CT imaging and further evaluation.     I have ordered blood work to look at your kidney function, electrolytes, liver function, and blood cells.     An order for a stool sample has also been placed. Please bring sample to any [place] lab.     Continue following a bland diet and increasing your fluid intake. Take the Imitrex as needed for migraine headaches.

## 2024-04-11 NOTE — ADDENDUM NOTE
Addended by: EREN SANCHEZ on: 4/11/2024 05:53 PM     Modules accepted: Orders     TRIAGE: \"spiked a 102 fever out of nowhere with no other symptoms\" no medicine given

## 2024-04-11 NOTE — RESULT ENCOUNTER NOTE
Lito Sinha,     Your Hemoglobin A1c has come back elevated at 7.2 indicating type 2 diabetes. I have prescribed a medication called Metformin. This medication can upset your stomach and cause diarrhea. Dosing starts at 500 mg per day and is increased every 1-2 weeks as tolerated until 2000 mg per day has been reached. Given your current concerns of diarrhea, I think you should wait to start this medication until your symptoms have improved/resolved.     Please schedule and appointment with Dr. Hay for your annual physical exam and to further control and monitor your type 2 diabetes.     Take care,   Heidi LANGFORD, CNP

## 2024-04-11 NOTE — PROGRESS NOTES
Assessment & Plan  1. Diarrhea, unspecified type  Patient is 73-year-old female with a history of prediabetes, depression, anxiety, and lumbar back pain presenting with persistent diarrhea.  Differential diagnoses include but not limited to viral gastroenteritis, infectious diarrhea, noninfectious diarrhea, diverticulitis, etc..  Have high suspicion for acute diverticulitis based on HPI and pertinent physical exam findings of left lower quadrant tenderness to palpation.  Plan to obtain CMP and CBC today, results pending.  Additionally, ordered stool pathogen.  Referral placed for acute and diagnostic services.  Spoke with attending clinician at [facility] regarding patient presentation and need for CT imaging.  Attending clinician instructed me to have patient present to [facility] at 1030 on 04/12/2024.  Further supportive measures including oral rehydration and continued bland diet was discussed with patient.  Patient understands and is agreeable to plan as discussed in clinic.  - Referral to Acute and Diagnostic Services (Day of diagnostic / First order acute); Future  - CBC with platelets and differential  - Comprehensive metabolic panel (BMP + Alb, Alk Phos, ALT, AST, Total. Bili, TP)  - Enteric Bacteria and Virus Panel PCR    2. LLQ abdominal pain  As above.  - Referral to Acute and Diagnostic Services (Day of diagnostic / First order acute); Future    3. Prediabetes  Last hemoglobin A1c testing was done April 2023.  Patient was diabetic at that time.  Patient requesting repeat testing today, results pending. Patient due for annual wellness exam after 4/12/23.   - Hemoglobin A1c    4. Class 2 severe obesity due to excess calories with serious comorbidity and body mass index (BMI) of 35.0 to 35.9 in adult (H)  As above.   - Hemoglobin A1c    5. Anxiety  Patient has been experiencing heightened anxiety with recent diagnosis of COVID-19 on 4/1/2024.  Reassurance and explanations provided during encounter. Time allowed for  patient to ask questions.    Subjective   Bharath is a 73 year old, presenting for the following health issues:  Diarrhea      4/11/2024     3:45 PM   Additional Questions   Roomed by Ifrah   Accompanied by Self         4/11/2024     3:45 PM   Patient Reported Additional Medications   Patient reports taking the following new medications NA     History of Present Illness       Reason for visit:  Diarrhea  Symptom onset:  3-7 days ago    She eats 2-3 servings of fruits and vegetables daily.She consumes 0 sweetened beverage(s) daily.She exercises with enough effort to increase her heart rate 20 to 29 minutes per day.  She exercises with enough effort to increase her heart rate 3 or less days per week.   She is taking medications regularly.     Presents with concerns of ongoing persistent diarrhea for the past 7-8 days.  States that diarrhea has been intermittent and had been having upwards of 5+ stools per day. Consistency of stools have been inconsistent and ranging from watery to semiformed.  Associated left lower back pain that radiates into left lower quadrant.     Reports she is drinking roughly five to six 8 ounce glasses of water per day.  Bowel movements do not seem to be provoked by food or fluid intake.  Does endorse associated decreased appetite. Has been following a bland/BRAT like diet over the past few days.     Ongoing left lower back pain that radiates into left buttocks and down left leg.  Has a history of lumbar laminectomy/discectomy in 2008.  Back pain remains unchanged from chronic pain.  Has been taking Tylenol and ibuprofen as needed with symptom relief.  Denies red flag symptoms including loss of bowel or bladder control, saddle anesthesia, etc..     Additionally, experiencing increasing frequency of migraine headaches.  Headache is located left frontal.  Has been taking as needed Tylenol and ibuprofen with symptom relief.  Has a prescription for as needed Imitrex.  Has not used this medication as  "she was worried this would affect her bowels.  Reassured patient that she should take as needed Imitrex for migraine headaches.    No previous colonoscopy.  Patient had been doing stool FIT testing for colon cancer screening.     Denies nausea, vomiting, and irritative voiding symptoms.       Objective    /72   Pulse 79   Temp 98.3  F (36.8  C) (Temporal)   Resp 22   Ht 1.633 m (5' 4.29\")   Wt 94.6 kg (208 lb 8 oz)   LMP 01/01/2005   SpO2 95%   BMI 35.47 kg/m    Body mass index is 35.47 kg/m .  Physical Exam  Vitals reviewed.   Constitutional:       General: She is not in acute distress.     Appearance: Normal appearance.   HENT:      Head: Normocephalic and atraumatic.      Right Ear: Tympanic membrane, ear canal and external ear normal. Tympanic membrane is not scarred or erythematous.      Left Ear: Tympanic membrane, ear canal and external ear normal. Tympanic membrane is not scarred or erythematous.      Mouth/Throat:      Mouth: Mucous membranes are moist.      Pharynx: No oropharyngeal exudate or posterior oropharyngeal erythema.   Eyes:      Pupils: Pupils are equal, round, and reactive to light.   Cardiovascular:      Rate and Rhythm: Normal rate and regular rhythm.      Heart sounds: Normal heart sounds, S1 normal and S2 normal. No murmur heard.  Pulmonary:      Effort: Pulmonary effort is normal.      Breath sounds: Normal breath sounds and air entry.   Abdominal:      General: Abdomen is flat. Bowel sounds are normal. There is distension.      Palpations: Abdomen is soft.      Tenderness: There is abdominal tenderness in the left lower quadrant.   Musculoskeletal:      Cervical back: Normal range of motion and neck supple.   Lymphadenopathy:      Cervical: No cervical adenopathy.   Skin:     General: Skin is warm and dry.      Capillary Refill: Capillary refill takes less than 2 seconds.   Neurological:      Mental Status: She is alert.   Psychiatric:         Attention and Perception: " Attention and perception normal.         Mood and Affect: Affect normal. Mood is anxious.         Behavior: Behavior is cooperative.     Labs pending      Signed Electronically by: ANASTASIYA Mckeon CNP    Total time spent today for this visit is 47 minutes including precharting, patient interview, exam, review of labs/imaging, developing plan together with the patient, and medical documentation.

## 2024-04-11 NOTE — RESULT ENCOUNTER NOTE
Hello,     The complete blood count that was checked during your visit is normal. I will reach out as additional labs result. Please continue with plan as discussed in clinic.     Take care,  Heidi LANGFORD, CNP

## 2024-04-12 ENCOUNTER — OFFICE VISIT (OUTPATIENT)
Dept: PEDIATRICS | Facility: CLINIC | Age: 74
End: 2024-04-12
Payer: COMMERCIAL

## 2024-04-12 ENCOUNTER — ANCILLARY PROCEDURE (OUTPATIENT)
Dept: GENERAL RADIOLOGY | Facility: CLINIC | Age: 74
End: 2024-04-12
Attending: FAMILY MEDICINE
Payer: COMMERCIAL

## 2024-04-12 ENCOUNTER — ANCILLARY PROCEDURE (OUTPATIENT)
Dept: CT IMAGING | Facility: CLINIC | Age: 74
End: 2024-04-12
Attending: FAMILY MEDICINE
Payer: COMMERCIAL

## 2024-04-12 ENCOUNTER — ALLIED HEALTH/NURSE VISIT (OUTPATIENT)
Dept: FAMILY MEDICINE | Facility: OTHER | Age: 74
End: 2024-04-12
Payer: COMMERCIAL

## 2024-04-12 VITALS
OXYGEN SATURATION: 97 % | DIASTOLIC BLOOD PRESSURE: 64 MMHG | HEART RATE: 67 BPM | RESPIRATION RATE: 14 BRPM | SYSTOLIC BLOOD PRESSURE: 116 MMHG | TEMPERATURE: 98.8 F

## 2024-04-12 DIAGNOSIS — R10.32 LLQ ABDOMINAL PAIN: ICD-10-CM

## 2024-04-12 DIAGNOSIS — Z78.9 TRIAGE ASSESSMENT CLASS 3, NONURGENT: Primary | ICD-10-CM

## 2024-04-12 DIAGNOSIS — J18.9 PNEUMONIA OF RIGHT LOWER LOBE DUE TO INFECTIOUS ORGANISM: Primary | ICD-10-CM

## 2024-04-12 DIAGNOSIS — R05.1 ACUTE COUGH: ICD-10-CM

## 2024-04-12 DIAGNOSIS — R05.1 ACUTE COUGH: Primary | ICD-10-CM

## 2024-04-12 DIAGNOSIS — T50.8X5A ALLERGIC REACTION TO CONTRAST MATERIAL, INITIAL ENCOUNTER: ICD-10-CM

## 2024-04-12 DIAGNOSIS — J45.40 MODERATE PERSISTENT ASTHMA WITHOUT COMPLICATION: ICD-10-CM

## 2024-04-12 LAB
ADV 40+41 DNA STL QL NAA+NON-PROBE: NEGATIVE
ALBUMIN SERPL BCG-MCNC: 4.3 G/DL (ref 3.5–5.2)
ALBUMIN UR-MCNC: 20 MG/DL
ALP SERPL-CCNC: 100 U/L (ref 40–150)
ALT SERPL W P-5'-P-CCNC: 42 U/L (ref 0–50)
AMORPH CRY #/AREA URNS HPF: ABNORMAL /HPF
ANION GAP SERPL CALCULATED.3IONS-SCNC: 10 MMOL/L (ref 7–15)
APPEARANCE UR: ABNORMAL
AST SERPL W P-5'-P-CCNC: 30 U/L (ref 0–45)
ASTRO TYP 1-8 RNA STL QL NAA+NON-PROBE: NEGATIVE
BILIRUB SERPL-MCNC: 0.5 MG/DL
BILIRUB UR QL STRIP: NEGATIVE
BUN SERPL-MCNC: 12.8 MG/DL (ref 8–23)
C CAYETANENSIS DNA STL QL NAA+NON-PROBE: NEGATIVE
CALCIUM SERPL-MCNC: 9.2 MG/DL (ref 8.8–10.2)
CAMPYLOBACTER DNA SPEC NAA+PROBE: NEGATIVE
CHLORIDE SERPL-SCNC: 105 MMOL/L (ref 98–107)
COLOR UR AUTO: YELLOW
CREAT SERPL-MCNC: 0.68 MG/DL (ref 0.51–0.95)
CRYPTOSP DNA STL QL NAA+NON-PROBE: NEGATIVE
DEPRECATED HCO3 PLAS-SCNC: 25 MMOL/L (ref 22–29)
E COLI O157 DNA STL QL NAA+NON-PROBE: NORMAL
E HISTOLYT DNA STL QL NAA+NON-PROBE: NEGATIVE
EAEC ASTA GENE ISLT QL NAA+PROBE: NEGATIVE
EC STX1+STX2 GENES STL QL NAA+NON-PROBE: NEGATIVE
EGFRCR SERPLBLD CKD-EPI 2021: >90 ML/MIN/1.73M2
EPEC EAE GENE STL QL NAA+NON-PROBE: NEGATIVE
ERYTHROCYTE [DISTWIDTH] IN BLOOD BY AUTOMATED COUNT: 12.8 % (ref 10–15)
ETEC LTA+ST1A+ST1B TOX ST NAA+NON-PROBE: NEGATIVE
G LAMBLIA DNA STL QL NAA+NON-PROBE: NEGATIVE
GLUCOSE SERPL-MCNC: 109 MG/DL (ref 70–99)
GLUCOSE UR STRIP-MCNC: NEGATIVE MG/DL
HCT VFR BLD AUTO: 42 % (ref 35–47)
HGB BLD-MCNC: 14.1 G/DL (ref 11.7–15.7)
HGB UR QL STRIP: NEGATIVE
KETONES UR STRIP-MCNC: NEGATIVE MG/DL
LEUKOCYTE ESTERASE UR QL STRIP: NEGATIVE
MCH RBC QN AUTO: 28.5 PG (ref 26.5–33)
MCHC RBC AUTO-ENTMCNC: 33.6 G/DL (ref 31.5–36.5)
MCV RBC AUTO: 85 FL (ref 78–100)
NITRATE UR QL: NEGATIVE
NOROVIRUS GI+II RNA STL QL NAA+NON-PROBE: NEGATIVE
P SHIGELLOIDES DNA STL QL NAA+NON-PROBE: NEGATIVE
PH UR STRIP: 6 [PH] (ref 5–7)
PLATELET # BLD AUTO: 252 10E3/UL (ref 150–450)
POTASSIUM SERPL-SCNC: 4.3 MMOL/L (ref 3.4–5.3)
PROT SERPL-MCNC: 6.9 G/DL (ref 6.4–8.3)
RBC # BLD AUTO: 4.94 10E6/UL (ref 3.8–5.2)
RBC #/AREA URNS AUTO: ABNORMAL /HPF
RVA RNA STL QL NAA+NON-PROBE: NEGATIVE
SALMONELLA SP RPOD STL QL NAA+PROBE: NEGATIVE
SAPO I+II+IV+V RNA STL QL NAA+NON-PROBE: NEGATIVE
SHIGELLA SP+EIEC IPAH ST NAA+NON-PROBE: NEGATIVE
SODIUM SERPL-SCNC: 140 MMOL/L (ref 135–145)
SP GR UR STRIP: 1.02 (ref 1–1.03)
SQUAMOUS #/AREA URNS AUTO: ABNORMAL /LPF
UROBILINOGEN UR STRIP-MCNC: NORMAL MG/DL
V CHOLERAE DNA SPEC QL NAA+PROBE: NEGATIVE
VIBRIO DNA SPEC NAA+PROBE: NEGATIVE
WBC # BLD AUTO: 5.9 10E3/UL (ref 4–11)
WBC #/AREA URNS AUTO: ABNORMAL /HPF
Y ENTEROCOL DNA STL QL NAA+PROBE: NEGATIVE

## 2024-04-12 PROCEDURE — 80053 COMPREHEN METABOLIC PANEL: CPT | Performed by: FAMILY MEDICINE

## 2024-04-12 PROCEDURE — 87507 IADNA-DNA/RNA PROBE TQ 12-25: CPT

## 2024-04-12 PROCEDURE — 71046 X-RAY EXAM CHEST 2 VIEWS: CPT | Performed by: RADIOLOGY

## 2024-04-12 PROCEDURE — 99207 PR NO CHARGE NURSE ONLY: CPT

## 2024-04-12 PROCEDURE — 96374 THER/PROPH/DIAG INJ IV PUSH: CPT | Performed by: FAMILY MEDICINE

## 2024-04-12 PROCEDURE — 99417 PROLNG OP E/M EACH 15 MIN: CPT | Performed by: FAMILY MEDICINE

## 2024-04-12 PROCEDURE — 99215 OFFICE O/P EST HI 40 MIN: CPT | Mod: 25 | Performed by: FAMILY MEDICINE

## 2024-04-12 PROCEDURE — 85027 COMPLETE CBC AUTOMATED: CPT | Performed by: FAMILY MEDICINE

## 2024-04-12 PROCEDURE — 74177 CT ABD & PELVIS W/CONTRAST: CPT | Mod: GC | Performed by: RADIOLOGY

## 2024-04-12 PROCEDURE — 81001 URINALYSIS AUTO W/SCOPE: CPT | Performed by: FAMILY MEDICINE

## 2024-04-12 PROCEDURE — 36415 COLL VENOUS BLD VENIPUNCTURE: CPT | Performed by: FAMILY MEDICINE

## 2024-04-12 PROCEDURE — 96361 HYDRATE IV INFUSION ADD-ON: CPT | Performed by: FAMILY MEDICINE

## 2024-04-12 RX ORDER — ALBUTEROL SULFATE 90 UG/1
2 AEROSOL, METERED RESPIRATORY (INHALATION) EVERY 6 HOURS PRN
Qty: 18 G | Refills: 0 | Status: SHIPPED | OUTPATIENT
Start: 2024-04-12

## 2024-04-12 RX ORDER — LEVOFLOXACIN 750 MG/1
750 TABLET, FILM COATED ORAL DAILY
Qty: 5 TABLET | Refills: 0 | Status: SHIPPED | OUTPATIENT
Start: 2024-04-12 | End: 2024-04-17

## 2024-04-12 RX ORDER — IOPAMIDOL 755 MG/ML
114 INJECTION, SOLUTION INTRAVASCULAR ONCE
Status: COMPLETED | OUTPATIENT
Start: 2024-04-12 | End: 2024-04-12

## 2024-04-12 RX ORDER — METFORMIN HCL 500 MG
TABLET, EXTENDED RELEASE 24 HR ORAL
Qty: 374 TABLET | Refills: 0 | Status: SHIPPED | OUTPATIENT
Start: 2024-04-12 | End: 2024-04-16

## 2024-04-12 RX ORDER — DIPHENHYDRAMINE HYDROCHLORIDE 50 MG/ML
50 INJECTION INTRAMUSCULAR; INTRAVENOUS ONCE
Status: COMPLETED | OUTPATIENT
Start: 2024-04-12 | End: 2024-04-12

## 2024-04-12 RX ADMIN — IOPAMIDOL 114 ML: 755 INJECTION, SOLUTION INTRAVASCULAR at 11:57

## 2024-04-12 RX ADMIN — DIPHENHYDRAMINE HYDROCHLORIDE 50 MG: 50 INJECTION INTRAMUSCULAR; INTRAVENOUS at 12:09

## 2024-04-12 NOTE — NURSING NOTE
"Clinic Administered Medication Documentation    Noted facial hives after patient returned from CT with contrast. MD gave orders to administer medication below:    Patient was given Diphenhydramine (Benadryl 50 mg) IV. Prior to medication administration, verified patient's identity using patient s name and date of birth. Please see MAR and medication order for additional information. Patient instructed to remain in clinic for 15 minutes and report any adverse reaction to staff immediately.    Vial/Syringe: Single dose vial. Was entire vial of medication used? Yes    Patient coughed x 2 times 1 minute after Benadryl given, stated \"feels like my throat is burning.\" Patient reports feeling lightheaded and tired. Patient assisted to exam bed to rest, MD notified and at patient bedside. MD reviewed side effects of Benadryl.  Patient denies trouble breathing and states itching no longer present. Noted facial hives improving.  MD/nurse remained in room for monitoring 25 minutes, VS patient stable with MD assessing patient (wnl). Patient states coughing is better now.      KIMBERLY Polanco  [facility] Triage  [place]             "

## 2024-04-12 NOTE — PROGRESS NOTES
Acute and Diagnostic Services Clinic Visit    Assessment & Plan     LLQ abdominal pain, diarrhea: 8d diarrhea. Started a couple days into covid symptoms with diarrhea. Pain just started in the left lower quadrant in the last few days - it is not severe, but it is persistent and prior to bowel movements can be quite significant - has not woken her from sleep. Does make her appetite low and she is trying to drink lots of fluids but thinks she isn't keeping up - I did give her 1L Lactated Ringers for hydration, completed CT scan which was unremarkable except pneumonia as below. Her WBCs, UA and CMP are normal so I suspect this is lingering covid symptom/viral shedding vs mild colitis that will self-resolve. She does have pain on exam but no peritoneal signs. Can take Imodium for symptom management, continue hydration.   - CBC with platelets; Future  - Comprehensive metabolic panel; Future  - UA with Microscopic reflex to Culture; Future  - CT Abdomen Pelvis w Contrast; Future  - UA with Microscopic reflex to Culture  - Comprehensive metabolic panel  - CBC with platelets  - sodium chloride (PF) 0.9% PF flush 3 mL  - UA Microscopic with Reflex to Culture  - diphenhydrAMINE (BENADRYL) injection 50 mg  - lactated ringers BOLUS 1,000 mL    Acute cough - suspect early pneumonia RLL of unknown organism: recent Covid infection - all symptoms improved except cough is worsening, productive, and she has crackles in the RLL on exam and CT findings of the RLL that also support this diagnosis. Will treat with Levaquin (allergy to penicillin).   - XR Chest 2 Views; Future  - albuterol (PROAIR HFA/PROVENTIL HFA/VENTOLIN HFA) 108 (90 Base) MCG/ACT inhaler; Inhale 2 puffs into the lungs every 6 hours as needed for shortness of breath, wheezing or cough  - levofloxacin (LEVAQUIN) 750 MG tablet; Take 1 tablet (750 mg) by mouth daily for 5 days    Moderate persistent asthma without complication: chronic underlying asthma. She is actually  not wheezing now, but it sounds like she had substantial wheezing in the early days of Covid and did not have any inhaler available so I sent her an albuterol to use as needed. Poorly controlled asthma may have increased her risk for superinfection by bacteria.   - albuterol (PROAIR HFA/PROVENTIL HFA/VENTOLIN HFA) 108 (90 Base) MCG/ACT inhaler; Inhale 2 puffs into the lungs every 6 hours as needed for shortness of breath, wheezing or cough    Contrast Allergy: Approximately 25min after completion of CT scan, Bharath developed urticaria on her forehead and bilateral arms - scattered. They were extremely irritating/itchy for her so we did administer IV benadryl which immediately relieved the symptoms. We monitored her for several hours (due to awaiting test results primarily) and she continued to do well. Never developed hypotension, respiratory symptoms, or other more serious sequelae of contrast dye. Did add to her allergy list, could consider allergy testing to enable future CTs with pre-medication. She is not interested in this at this time.     60 minutes were spent doing chart review, history and exam, documentation and further activities per the note.    No follow-ups on file.    Subjective   Bharath is a 73 year old, presenting for the following health issues:  Abdominal Pain and Diarrhea    HPI     Abdominal/Flank Pain  Onset/Duration: 8 days ago. Also was diagnosed with Covid a couple days before that - was prescribed Paxlovid but did not end up taking that (concerned for side effects).   Description:   Character: Dull ache  Location: left lower quadrant  Radiation: Back  Intensity: mild, moderate at times.  Progression of Symptoms:  improving overall, but still very prominent and intermittently significant.   Accompanying Signs & Symptoms:  Fever/chills: no   Gas/Bloating: no   Nausea: no   Vomitting: no   Diarrhea: YES-Diarrhea is 4-6x/day of loose watery stool with mucus (though that has improved today - less  mucus)  Constipation:no   Dysuria: no            Hematuria: no            Frequency: no            Incontinence of urine: no   History:            Last bowel movement: today  Trauma: no   Previous similar pain: no    Previous tests done: none           Previous Abdominal surgery: no   Precipitating factors:   Does the pain change with:     Food: YES - seems to cause some lower belly cramping.     Bowel Movement: YES - uncomfortable initially, then relieves it.     Urination: no symptoms             Other factors: no   Therapies tried and outcome:  None    When food last eaten: 4/12 AM    Cough:   In addition to the above which initially is why she came in, she notes that she has a severe cough - getting worse instead of better since covid symptoms improved. She coughs up mucus, feels tight at times (though mostly the tightness was earlier when she had the worse covid symptoms, now just tight before coughing). Feels a little pain in the right lung when she coughs or takes a deep breath. Has felt hot+cold, but has not checked her temp.   No chest pain or lower extremity swelling.         Objective    /64 (BP Location: Left arm, Patient Position: Supine, Cuff Size: Adult Large)   Pulse 67   Temp 98.8  F (37.1  C) (Oral)   Resp 14   LMP 01/01/2005   SpO2 97%   There is no height or weight on file to calculate BMI.  Physical Exam   Well appearing, in no acute distress.  Abdomen: obese, soft, mildly tender in the LLQ and a little but less in RLQ. No guarding or rebound, normal bowel sounds.   Card: RRR, no murmur, normal S1/S2, no LE swelling.  Resp: Coughs frequently with production. There is distinct cracking in the RLL field, no wheezing or other areas of focal crackles/rales. Good chest distention with inspiration but does have splinting on deep inspiration.     Results for orders placed or performed in visit on 04/12/24   CT Abdomen Pelvis w Contrast     Status: None    Narrative    EXAMINATION: CT ABDOMEN  PELVIS W CONTRAST, 4/12/2024 12:06 PM    INDICATION: LLQ abdominal pain    COMPARISON STUDY: None    TECHNIQUE: CT scan of the abdomen and pelvis was performed on  multidetector CT scanner using volumetric acquisition technique and  images were reconstructed in multiple planes with variable thickness  and reviewed on dedicated workstations.     CONTRAST: 114ml isovue 370 injected IV without oral contrast    CT scan radiation dose is optimized to minimum requisite dose using  automated dose modulation techniques.    FINDINGS:    Lower thorax: Right lower lobe posterior ground glass and reticular  opacities.    Liver: No mass. No intrahepatic biliary ductal dilation.  Diffusely  hypoattenuating hepatic parenchyma.    Biliary System: Normal gallbladder. No extrahepatic biliary ductal  dilation.    Adrenal glands: No mass or nodules    Spleen: Normal.    Kidneys: No suspicious mass, obstructing calculus or hydronephrosis.    Pancreas: Diffuse fatty infiltration.    Gastrointestinal tract :Normal appendix (series 2, image 56). Normal  caliber small bowel.  Colonic diverticulosis with no diverticulitis.  Small sliding hiatal hernia.    Pelvis: Urinary bladder is normal.    Vasculature: Patent major abdominal vasculature.    Mesentery/peritoneum/retroperitoneum: No mass. No free fluid or air.    Lymph nodes: No significant lymphadenopathy.    Osseous structures: No aggressive or acute osseous lesion.   Sacralization of L5.      Soft tissues: Right lower extremity intramuscular lipoma (series 2,  image 84)      Impression    IMPRESSION:   1. No findings to explain the patient's left lower quadrant pain.  2. Minimal colonic diverticulosis without acute diverticulitis.  3. Hepatic steatosis.  4. Patchy groundglass and reticular opacities in the posterior right  lower lobe, suspicious for infection and/or evolving scarring related  to prior infectious/inflammatory insult.    I have personally reviewed the examination and  initial interpretation  and I agree with the findings.    CORAZON OSULLIVAN DO         SYSTEM ID:  [id number]   Results for orders placed or performed in visit on 04/12/24   XR Chest 2 Views     Status: None    Narrative    Chest 2 views    INDICATION: Acute cough    COMPARISON: 11/11/2016    FINDINGS: Heart size and shape appear normal. Lungs and pulmonary  vasculature appear normal. There is some even infiltration of the  anterior aspect of the right hemidiaphragm. Bones are osteopenic with  mild degenerative changes throughout the thoracic spine.      Impression    IMPRESSION: Mild eventration right hemidiaphragm without new opacity  in the lungs to suggest infection or other etiology.    RAHEL BAE MD         SYSTEM ID:  [id number]   Results for orders placed or performed in visit on 04/12/24   UA with Microscopic reflex to Culture     Status: Abnormal    Specimen: Urine, Clean Catch   Result Value Ref Range    Color Urine Yellow Colorless, Straw, Light Yellow, Yellow    Appearance Urine Slightly Cloudy (A) Clear    Glucose Urine Negative Negative mg/dL    Bilirubin Urine Negative Negative    Ketones Urine Negative Negative mg/dL    Specific Gravity Urine 1.025 0.999 - 1.035    Blood Urine Negative Negative    pH Urine 6.0 5.0 - 7.0    Protein Albumin Urine 20 (A) Negative mg/dL    Nitrite Urine Negative Negative    Leukocyte Esterase Urine Negative Negative    Urobilinogen Urine Normal Normal, 2.0 mg/dL   Comprehensive metabolic panel     Status: Abnormal   Result Value Ref Range    Sodium 140 135 - 145 mmol/L    Potassium 4.3 3.4 - 5.3 mmol/L    Carbon Dioxide (CO2) 25 22 - 29 mmol/L    Anion Gap 10 7 - 15 mmol/L    Urea Nitrogen 12.8 8.0 - 23.0 mg/dL    Creatinine 0.68 0.51 - 0.95 mg/dL    GFR Estimate >90 >60 mL/min/1.73m2    Calcium 9.2 8.8 - 10.2 mg/dL    Chloride 105 98 - 107 mmol/L    Glucose 109 (H) 70 - 99 mg/dL    Alkaline Phosphatase 100 40 - 150 U/L    AST 30 0 - 45 U/L    ALT 42 0 - 50 U/L     Protein Total 6.9 6.4 - 8.3 g/dL    Albumin 4.3 3.5 - 5.2 g/dL    Bilirubin Total 0.5 <=1.2 mg/dL   CBC with platelets     Status: Normal   Result Value Ref Range    WBC Count 5.9 4.0 - 11.0 10e3/uL    RBC Count 4.94 3.80 - 5.20 10e6/uL    Hemoglobin 14.1 11.7 - 15.7 g/dL    Hematocrit 42.0 35.0 - 47.0 %    MCV 85 78 - 100 fL    MCH 28.5 26.5 - 33.0 pg    MCHC 33.6 31.5 - 36.5 g/dL    RDW 12.8 10.0 - 15.0 %    Platelet Count 252 150 - 450 10e3/uL   UA Microscopic with Reflex to Culture     Status: Abnormal   Result Value Ref Range    RBC Urine None Seen 0-2 /HPF /HPF    WBC Urine 0-5 0-5 /HPF /HPF    Squamous Epithelials Urine Many (A) None Seen /LPF    Amorphous Crystals Urine Many (A) None Seen /HPF    Narrative    Urine Culture not indicated            Signed Electronically by: Lisa Maradiaga MD

## 2024-04-12 NOTE — RESULT ENCOUNTER NOTE
Hello,    The complete metabolic panel (CMP) evaluating your kidney function and electrolytes came back normal except for  one of the liver functions (ALT) which was slightly elevated. Nothing to be concerned about at this time. Please continue with the plan as discussed in clinic.     Take care,   Heidi LANGFORD, CNP

## 2024-04-12 NOTE — PROGRESS NOTES
Patient had CT with contrast completed at [facility].   She had peripheral IV placed to right AC for contrast. This was not removed before she left the clinic.     Patient came to [facility] to have this removed.     PIV was removed from right AC without complications. No concerns for bleeding once removed. Gauze applied and secured with tape. Instructed patient to call if concerns at IV insertion site arise. Patient expressed understanding and left clinic in stable condition.     Kelly Hanson BSN, RN

## 2024-04-12 NOTE — PATIENT INSTRUCTIONS
"Today, we did a CT of your abdomen because of that pain in your left lower belly and the diarrhea.   Fortunately the CT does not show any serious infection in your colon which is what we were worried about.   It DID show possibly pneumonia in the right lower part of your lungs and since your cough and my lung exam are suspicious, I would like to treat you for that with a medication called \"Levaquin\" that you take once a day for 5 days.    For the diarrhea, if you are having 3-4/day, you can take the over-the-counter medication called \"Imodium\" to help relieve that diarrhea.     You should also drink lots of water today and tomorrow to help keep you hydrated.     Unfortunately, you had a reaction to the contrast dye and we gave you 50mg of benadryl which very quickly helped your hives but did make you feel very out of it - you have been getting better and your vitals (Blood pressure, pulse, oxygen) are all normal.    I think if you can avoid contrast dye in the future you should, but if you ever have to for something that requires that, they likely will give you some benadryl at the same time and monitor you afterward.   "

## 2024-04-16 ENCOUNTER — OFFICE VISIT (OUTPATIENT)
Dept: FAMILY MEDICINE | Facility: OTHER | Age: 74
End: 2024-04-16
Payer: COMMERCIAL

## 2024-04-16 VITALS
SYSTOLIC BLOOD PRESSURE: 110 MMHG | DIASTOLIC BLOOD PRESSURE: 70 MMHG | WEIGHT: 206 LBS | BODY MASS INDEX: 35.04 KG/M2 | RESPIRATION RATE: 16 BRPM | TEMPERATURE: 97.4 F | OXYGEN SATURATION: 98 % | HEART RATE: 72 BPM

## 2024-04-16 DIAGNOSIS — E11.9 TYPE 2 DIABETES MELLITUS WITHOUT COMPLICATION, WITHOUT LONG-TERM CURRENT USE OF INSULIN (H): Primary | ICD-10-CM

## 2024-04-16 DIAGNOSIS — J18.9 PNEUMONIA OF RIGHT LOWER LOBE DUE TO INFECTIOUS ORGANISM: ICD-10-CM

## 2024-04-16 DIAGNOSIS — R19.7 DIARRHEA, UNSPECIFIED TYPE: ICD-10-CM

## 2024-04-16 DIAGNOSIS — R05.1 ACUTE COUGH: ICD-10-CM

## 2024-04-16 PROBLEM — R73.03 PREDIABETES: Status: RESOLVED | Noted: 2023-04-11 | Resolved: 2024-04-16

## 2024-04-16 LAB
CHOLEST SERPL-MCNC: 151 MG/DL
CREAT UR-MCNC: 233.4 MG/DL
FASTING STATUS PATIENT QL REPORTED: NO
HDLC SERPL-MCNC: 42 MG/DL
LDLC SERPL CALC-MCNC: 94 MG/DL
MICROALBUMIN UR-MCNC: <12 MG/L
MICROALBUMIN/CREAT UR: NORMAL MG/G{CREAT}
NONHDLC SERPL-MCNC: 109 MG/DL
TRIGL SERPL-MCNC: 77 MG/DL

## 2024-04-16 PROCEDURE — 36415 COLL VENOUS BLD VENIPUNCTURE: CPT | Performed by: FAMILY MEDICINE

## 2024-04-16 PROCEDURE — 80061 LIPID PANEL: CPT | Performed by: FAMILY MEDICINE

## 2024-04-16 PROCEDURE — 82570 ASSAY OF URINE CREATININE: CPT | Performed by: FAMILY MEDICINE

## 2024-04-16 PROCEDURE — 82043 UR ALBUMIN QUANTITATIVE: CPT | Performed by: FAMILY MEDICINE

## 2024-04-16 PROCEDURE — 99214 OFFICE O/P EST MOD 30 MIN: CPT | Performed by: FAMILY MEDICINE

## 2024-04-16 RX ORDER — ATORVASTATIN CALCIUM 20 MG/1
20 TABLET, FILM COATED ORAL DAILY
Qty: 90 TABLET | Refills: 3 | Status: SHIPPED | OUTPATIENT
Start: 2024-04-16 | End: 2024-06-11

## 2024-04-16 RX ORDER — RESPIRATORY SYNCYTIAL VIRUS VACCINE 120MCG/0.5
0.5 KIT INTRAMUSCULAR ONCE
Qty: 1 EACH | Refills: 0 | Status: CANCELLED | OUTPATIENT
Start: 2024-04-16 | End: 2024-04-16

## 2024-04-16 ASSESSMENT — ENCOUNTER SYMPTOMS: DIARRHEA: 1

## 2024-04-16 ASSESSMENT — PAIN SCALES - GENERAL: PAINLEVEL: NO PAIN (0)

## 2024-04-16 NOTE — PROGRESS NOTES
"  Assessment & Plan     Type 2 diabetes mellitus without complication, without long-term current use of insulin (H)  We discussed her new diagnosis of diabetes.  At this time we will not start the metformin.  Instead we will have patient see diabetes educator and nutritionist.  She will work on lifestyle changes.  She will follow-up discussed possible medication.  She plans to see her eye doctor soon.  Will check urine albumin.  We did discuss cardiac risk and recommended statin and she is agreeable to start this.  Will check a lipid profile today and we will recheck that in 3 months after she starts a statin.    - atorvastatin (LIPITOR) 20 MG tablet; Take 1 tablet (20 mg) by mouth daily  - Adult Diabetes Education  Referral; Future  - Albumin Random Urine Quantitative with Creat Ratio  - Lipid panel reflex to direct LDL Non-fasting    Diarrhea, unspecified type  Improving.  She finishes her Levaquin today.    Pneumonia of right lower lobe due to infectious organism  Improving.  She finishes her Levaquin today.      BMI  Estimated body mass index is 35.04 kg/m  as calculated from the following:    Height as of 4/11/24: 1.633 m (5' 4.29\").    Weight as of this encounter: 93.4 kg (206 lb).   Weight management plan: Discussed healthy diet and exercise guidelines    Delilah Sinha is a 73 year old, presenting for the following health issues:  Diarrhea        4/16/2024     8:43 AM   Additional Questions   Roomed by ayaan lee     Via the Health Maintenance questionnaire, the patient has reported the following services have been completed -Eye Exam, this information has been sent to the abstraction team.  Diarrhea    History of Present Illness       Reason for visit:  Diarrhea  Symptom onset:  3-7 days ago    She eats 2-3 servings of fruits and vegetables daily.She consumes 0 sweetened beverage(s) daily.She exercises with enough effort to increase her heart rate 20 to 29 minutes per day.  She exercises with " "enough effort to increase her heart rate 3 or less days per week.   She is taking medications regularly.       Acute Illness  Acute illness concerns: pneumonia  Onset/Duration: 4 days (dx'd on 4/12/24)  Symptoms:  Fever: No  Chills/Sweats: No  Headache (location?): YES  Sinus Pressure: No  Conjunctivitis:  No  Ear Pain: no  Rhinorrhea: YES  Congestion: No  Sore Throat: No  Cough: YES-improving over time  Wheeze: No  Decreased Appetite: YES  Nausea: No  Vomiting: No  Diarrhea: YES- has taken Imodium, seemed to help. Does not take it everyday. Had a veggie burger over the weekend and the next day she had a \"blowout\"  Dysuria/Freq.: No  Dysuria or Hematuria: No  Fatigue/Achiness: YES  Sick/Strep Exposure: No  Therapies tried and outcome: currently on Levoflaxin    A couple of weeks ago patient contracted COVID.  She had severe migraines with this.  She had a cough with pain with deep inspiration on the right.  She then developed left lower quadrant pain and diarrhea.  She was seen in [place] and had a CT done which revealed right lower lobe pneumonia but no diverticulitis.  Patient had a contrast reaction which improved with IV Benadryl.  During this evaluation she was also diagnosed with diabetes.  She was given a prescription for metformin but told not to take it until her diarrhea resolved.    Today patient states that her cough is improving.  She denies any wheezing.  She denies shortness of breath.  She still has some mild left lower quadrant discomfort but it is improving as well.      Objective    /70 (BP Location: Right arm, Patient Position: Sitting, Cuff Size: Adult Large)   Pulse 72   Temp 97.4  F (36.3  C) (Temporal)   Resp 16   Wt 93.4 kg (206 lb)   LMP 01/01/2005   SpO2 98%   BMI 35.04 kg/m    Body mass index is 35.04 kg/m .  Physical Exam   Gen: no apparent distress  NECK: no adenopathy, no asymmetry, no masses  Chest: clear to auscultation without wheeze, rale or rhonchi  Cor: regular rate and " rhythm without murmur  ABDOMEN: soft, nontender, no masses and bowel sounds normal  Psych: Alert and oriented times 3; coherent speech, normal   rate and volume, able to articulate logical thoughts, able   to abstract reason, no tangential thoughts, no hallucinations   or delusions  Her affect is bright  Diabetic foot exam: normal DP and PT pulses, no trophic changes or ulcerative lesions, normal sensory exam, and normal monofilament exam           Signed Electronically by: Precious Hay MD

## 2024-04-17 ENCOUNTER — NURSE TRIAGE (OUTPATIENT)
Dept: NURSING | Facility: CLINIC | Age: 74
End: 2024-04-17
Payer: COMMERCIAL

## 2024-04-17 NOTE — TELEPHONE ENCOUNTER
Pt calling that she had COVID for two weeks and also pt had an A1C done and became upset.  Pt has questions about her new dx of diabetes and is anxious.  Pt requesting appt with PCP.  Pt transferred to  to schedule OV within the next 3 days, if no appt available pt aware of Urgent Care hours/options.  Makayla Hicks RN  FNA Nurse Advisor     Reason for Disposition   MODERATE anxiety (e.g., persistent or frequent anxiety symptoms; interferes with sleep, school, or work)    Additional Information   Negative: SEVERE difficulty breathing (e.g., struggling for each breath, speaks in single words)   Negative: Bluish (or gray) lips or face   Negative: Difficult to awaken or acting confused (e.g., disoriented, slurred speech)   Negative: Hysterical or combative behavior   Negative: Sounds like a life-threatening emergency to the triager   Negative: Chest pain   Negative: Palpitations, skipped heartbeat, or rapid heartbeat is main symptom   Negative: Cough is main symptom   Negative: Suicide thoughts, threats, attempts, or questions   Negative: Depression is main problem or symptom (e.g., feelings of sadness or hopelessness)   Negative: Difficulty breathing and persists > 10 minutes and not relieved by reassurance provided by triager   Negative: Lightheadedness or dizziness and persists > 10 minutes and not relieved by reassurance provided by triager   Negative: SEVERE anxiety (e.g., extremely anxious with intense emotional symptoms such as feeling of unreality, urge to flee, unable to calm down; unable to cope or function), which is not better after 10 minutes of reassurance and Care Advice   Negative: Panic attack symptoms (e.g., sudden onset of intense fear and symptoms such as dizziness, feeling of impending doom or fear of dying, hyperventilation, numbness or tingling, sweating, trembling), and has not been evaluated for this by doctor (or NP/PA)   Negative: Panic attack symptoms (diagnosed in the past) that is  not better with usual treatment, reassurance, or Care Advice   Negative: Alcohol or drug use, known or suspected, and feeling very shaky (i.e., visible tremors of hands)   Negative: Patient sounds very sick or weak to the triager   Negative: Patient sounds very upset or troubled to the triager    Protocols used: Anxiety and Panic Attack-A-OH

## 2024-04-19 ENCOUNTER — VIRTUAL VISIT (OUTPATIENT)
Dept: FAMILY MEDICINE | Facility: OTHER | Age: 74
End: 2024-04-19
Payer: COMMERCIAL

## 2024-04-19 DIAGNOSIS — F41.9 ANXIETY: Primary | ICD-10-CM

## 2024-04-19 DIAGNOSIS — E11.65 TYPE 2 DIABETES MELLITUS WITH HYPERGLYCEMIA, WITHOUT LONG-TERM CURRENT USE OF INSULIN (H): ICD-10-CM

## 2024-04-19 PROCEDURE — 99443 PR PHYSICIAN TELEPHONE EVALUATION 21-30 MIN: CPT | Mod: 93 | Performed by: FAMILY MEDICINE

## 2024-04-19 ASSESSMENT — ANXIETY QUESTIONNAIRES
6. BECOMING EASILY ANNOYED OR IRRITABLE: NOT AT ALL
GAD7 TOTAL SCORE: 8
7. FEELING AFRAID AS IF SOMETHING AWFUL MIGHT HAPPEN: MORE THAN HALF THE DAYS
5. BEING SO RESTLESS THAT IT IS HARD TO SIT STILL: SEVERAL DAYS
IF YOU CHECKED OFF ANY PROBLEMS ON THIS QUESTIONNAIRE, HOW DIFFICULT HAVE THESE PROBLEMS MADE IT FOR YOU TO DO YOUR WORK, TAKE CARE OF THINGS AT HOME, OR GET ALONG WITH OTHER PEOPLE: SOMEWHAT DIFFICULT
2. NOT BEING ABLE TO STOP OR CONTROL WORRYING: SEVERAL DAYS
1. FEELING NERVOUS, ANXIOUS, OR ON EDGE: MORE THAN HALF THE DAYS
4. TROUBLE RELAXING: SEVERAL DAYS
3. WORRYING TOO MUCH ABOUT DIFFERENT THINGS: SEVERAL DAYS
7. FEELING AFRAID AS IF SOMETHING AWFUL MIGHT HAPPEN: MORE THAN HALF THE DAYS
8. IF YOU CHECKED OFF ANY PROBLEMS, HOW DIFFICULT HAVE THESE MADE IT FOR YOU TO DO YOUR WORK, TAKE CARE OF THINGS AT HOME, OR GET ALONG WITH OTHER PEOPLE?: SOMEWHAT DIFFICULT
GAD7 TOTAL SCORE: 8

## 2024-04-19 ASSESSMENT — PATIENT HEALTH QUESTIONNAIRE - PHQ9
SUM OF ALL RESPONSES TO PHQ QUESTIONS 1-9: 9
10. IF YOU CHECKED OFF ANY PROBLEMS, HOW DIFFICULT HAVE THESE PROBLEMS MADE IT FOR YOU TO DO YOUR WORK, TAKE CARE OF THINGS AT HOME, OR GET ALONG WITH OTHER PEOPLE: SOMEWHAT DIFFICULT
SUM OF ALL RESPONSES TO PHQ QUESTIONS 1-9: 9

## 2024-04-19 ASSESSMENT — ENCOUNTER SYMPTOMS: NERVOUS/ANXIOUS: 1

## 2024-04-19 NOTE — PROGRESS NOTES
"Instructions Relayed to Patient by Virtual Roomer:     Patient is active on [email or web address]:   Relayed following to patient: \"It looks like you are active on [email or web address], are you able to join the visit this way? If not, do you need us to send you a link now or would you like your provider to send a link via text or email when they are ready to initiate the visit?\"    Reminded patient to ensure they were logged on to virtual visit by arrival time listed. Documented in appointment notes if patient had flexibility to initiate visit sooner than arrival time. If pediatric virtual visit, ensured pediatric patient along with parent/guardian will be present for video visit.     Patient offered the website [email or web address] and/or phone number to [email or web address] Help line: [phone]    Bharath is a 73 year old who is being evaluated via a billable telephone visit.    What phone number would you like to be contacted at? [phone]   How would you like to obtain your AVS? [place]  Originating Location (pt. Location): Home    Distant Location (provider location):  On-site    Assessment & Plan     Anxiety  We discussed about her anxiety about multiple different things  Had discussed that we could add BuSpar to see if this helps out with her anxiety, but she is concerned about side effects and would prefer not to take additional medicines for anxiety if she can at least understand why she is taking the medication she is.  She will continue on her Celexa without change.  Her son was on for the whole conversation and was very helpful to put things in perspective for his mother.    Type 2 diabetes mellitus with hyperglycemia, without long-term current use of insulin (H)  We discussed that she does need to eat.  We discussed eating more fresh fruits and fresh vegetables and decreasing her breads, pasta and potatoes.  We discussed decreasing processed foods.  We discussed she will get a lot more information when she sees a diabetes " educator and nutritionist.  We discussed that she has a very high likelihood of being able to control her diabetes through diet and we will keep her off medications for diabetes.  We discussed that the reason she was put on a statin was not because she had high cholesterol but to decrease her heart risk because she has diabetes.  We discussed possible side effects and that if she is having side effects we can always stop the medication.  We discussed that if she is too anxious to take the medicine right now we can stop it and talk about it further and she could always start it in the future.  After a long discussion she has decided that she would like to try taking the medication now.      Delilah Sinha is a 73 year old, presenting for the following health issues:  Anxiety      4/19/2024     3:37 PM   Additional Questions   Roomed by kerry   Accompanied by son     Via the Health Maintenance questionnaire, the patient has reported the following services have been completed -Eye Exam, this information has been sent to the abstraction team.  Anxiety     Pt has questions regarding new medication provider wants to put her on.  She is very anxious about her diagnosis of diabetes.  At her recent visit we talked about using a statin to decrease her cardiovascular risk however she went talk to her friends who told her it was a bad medicine and that she should not take it.  She has been very worried about what she can eat so she states she is not eating much more than 200 gene a day as she is concerned about her diabetes.  She states that she has been having some headaches at night which improves with magnesium.  She admits to being very very anxious and states when she her anxiety worsen she feels her heart race.    Depression and Anxiety   How are you doing with your depression since your last visit? Worsened - slightly  How are you doing with your anxiety since your last visit?  Worsened   Are you having other symptoms  that might be associated with depression or anxiety? Yes:  sweating, overthinking  Have you had a significant life event? Health Concerns   Do you have any concerns with your use of alcohol or other drugs? No    Social History     Tobacco Use    Smoking status: Never    Smokeless tobacco: Never    Tobacco comments:     no smokers in household   Vaping Use    Vaping status: Never Used   Substance Use Topics    Alcohol use: No    Drug use: No         4/11/2023    10:57 AM 3/13/2024    10:10 AM 4/19/2024     3:37 PM   PHQ   PHQ-9 Total Score 0 3 9   Q9: Thoughts of better off dead/self-harm past 2 weeks Not at all Not at all Not at all         4/11/2023    10:41 AM 4/11/2023    10:57 AM 4/19/2024     3:33 PM   SAGE-7 SCORE   Total Score  0 (minimal anxiety) 8 (mild anxiety)   Total Score 0 0 8         Suicide Assessment Five-step Evaluation and Treatment (SAFE-T)        Objective           Vitals:  No vitals were obtained today due to virtual visit.    Physical Exam   General: Alert and no distress //Respiratory: No audible wheeze, cough, or shortness of breath // Psychiatric:  Appropriate affect, tone, and pace of words       Phone call duration: 30 minutes  Signed Electronically by: Precious Hay MD

## 2024-05-12 SDOH — HEALTH STABILITY: PHYSICAL HEALTH: ON AVERAGE, HOW MANY DAYS PER WEEK DO YOU ENGAGE IN MODERATE TO STRENUOUS EXERCISE (LIKE A BRISK WALK)?: 6 DAYS

## 2024-05-12 SDOH — HEALTH STABILITY: PHYSICAL HEALTH: ON AVERAGE, HOW MANY MINUTES DO YOU ENGAGE IN EXERCISE AT THIS LEVEL?: 20 MIN

## 2024-05-12 ASSESSMENT — SOCIAL DETERMINANTS OF HEALTH (SDOH): HOW OFTEN DO YOU GET TOGETHER WITH FRIENDS OR RELATIVES?: ONCE A WEEK

## 2024-05-14 ENCOUNTER — OFFICE VISIT (OUTPATIENT)
Dept: FAMILY MEDICINE | Facility: OTHER | Age: 74
End: 2024-05-14
Attending: FAMILY MEDICINE
Payer: COMMERCIAL

## 2024-05-14 VITALS
WEIGHT: 195 LBS | SYSTOLIC BLOOD PRESSURE: 124 MMHG | HEIGHT: 64 IN | BODY MASS INDEX: 33.29 KG/M2 | HEART RATE: 77 BPM | RESPIRATION RATE: 16 BRPM | DIASTOLIC BLOOD PRESSURE: 70 MMHG | TEMPERATURE: 97.4 F | OXYGEN SATURATION: 96 %

## 2024-05-14 DIAGNOSIS — F33.2 SEVERE EPISODE OF RECURRENT MAJOR DEPRESSIVE DISORDER, WITHOUT PSYCHOTIC FEATURES (H): ICD-10-CM

## 2024-05-14 DIAGNOSIS — Z00.00 ENCOUNTER FOR MEDICARE ANNUAL WELLNESS EXAM: Primary | ICD-10-CM

## 2024-05-14 DIAGNOSIS — F41.9 ANXIETY: ICD-10-CM

## 2024-05-14 PROCEDURE — G0439 PPPS, SUBSEQ VISIT: HCPCS | Performed by: FAMILY MEDICINE

## 2024-05-14 PROCEDURE — 99213 OFFICE O/P EST LOW 20 MIN: CPT | Mod: 25 | Performed by: FAMILY MEDICINE

## 2024-05-14 RX ORDER — BUSPIRONE HYDROCHLORIDE 5 MG/1
5 TABLET ORAL 2 TIMES DAILY
Qty: 60 TABLET | Refills: 1 | Status: SHIPPED | OUTPATIENT
Start: 2024-05-14 | End: 2024-06-11

## 2024-05-14 RX ORDER — RESPIRATORY SYNCYTIAL VIRUS VACCINE 120MCG/0.5
0.5 KIT INTRAMUSCULAR ONCE
Qty: 1 EACH | Refills: 0 | Status: CANCELLED | OUTPATIENT
Start: 2024-05-14 | End: 2024-05-14

## 2024-05-14 ASSESSMENT — PATIENT HEALTH QUESTIONNAIRE - PHQ9
SUM OF ALL RESPONSES TO PHQ QUESTIONS 1-9: 24
10. IF YOU CHECKED OFF ANY PROBLEMS, HOW DIFFICULT HAVE THESE PROBLEMS MADE IT FOR YOU TO DO YOUR WORK, TAKE CARE OF THINGS AT HOME, OR GET ALONG WITH OTHER PEOPLE: EXTREMELY DIFFICULT
SUM OF ALL RESPONSES TO PHQ QUESTIONS 1-9: 24

## 2024-05-14 ASSESSMENT — PAIN SCALES - GENERAL: PAINLEVEL: NO PAIN (0)

## 2024-05-14 NOTE — PROGRESS NOTES
Preventive Care Visit  [facility]  Precious Hay MD, Family Medicine  May 14, 2024      Assessment & Plan     Encounter for Medicare annual wellness exam  - PRIMARY CARE FOLLOW-UP SCHEDULING    Severe episode of recurrent major depressive disorder, without psychotic features (H)/Anxiety    Patient's mood has deteriorated over the last month.  She admits to being very anxious and also feels down.  We discussed options such as stopping her Celexa and starting Effexor.  The thought of changing medications was making her very anxious and agitated.  Patient had already looked at BuSpar and was hoping this would help calm her down.  We discussed that BuSpar is only for anxiety and not depression.  However we discussed that if her anxiety was improved that her mood may improve as well.  Therefore at this time we will continue her Celexa without change and will add BuSpar.  Because she has failed multiple other medications and is very anxious about changing medications I feel that collaborative psychiatry consult would be helpful.  Will also refer to counseling.  Will have her follow-up with myself in 1 month.  If she is not noticing any change of the BuSpar then would consider changing her from Celexa to Effexor, unless she is able to get in with collaborative psychiatry prior to this who may have alternative therapy.    - busPIRone (BUSPAR) 5 MG tablet; Take 1 tablet (5 mg) by mouth 2 times daily  - Adult Mental Health  Referral; Future  - Adult Mental Health  Referral; Future    Depression Screening Follow Up          Follow Up Actions Taken  Crisis resource information provided in the After Visit Summary  Mental Health Referral placed    Discussed the following ways the patient can remain in a safe environment:  be around others  Counseling  Appropriate preventive services were discussed with this patient, including applicable screening as appropriate for fall  prevention, nutrition, physical activity, Tobacco-use cessation, weight loss and cognition.  Checklist reviewing preventive services available has been given to the patient.  Reviewed patient's diet, addressing concerns and/or questions.   The patient's PHQ-9 score is consistent with severe depression. She was provided with information regarding depression.       Delilah Sinha is a 73 year old, presenting for the following:  Medicare Visit, Anxiety, and Depression        5/14/2024     2:47 PM   Additional Questions   Roomed by ayaan lee         Health Care Directive  Patient does not have a Health Care Directive or Living Will: Discussed advance care planning with patient; information given to patient to review.    HPI    Having trouble with anxiety and depression.  Was on imipramine when she was 39, felt drugged up on it.  Prozac made anxiety worse.  Zoloft was on for years, then stopped working, went off of it, symptoms returned and when went back on, felt that anxiety worsened instead. Paxil was OK.  Doesn't remember being on Lexapro, Effexor, Remeron or Wellbutrin.  Has been on Celexa since 2007.  She had thought Celexa was doing well until the last month.  She had diarrhea, pneumonia and COVID and was diagnosed with diabetes.  Since then she has not been able to manage her anxiety.  She has done her research on BuSpar and she is interested on adding this.  She finds herself pacing.  She has a decreased appetite.  She is worried about everything.  She states she is no longer worried about being on a statin.  She also feels very down.  She does not like feeling this way and states that she wishes that she would die but denies being suicidal denies that she would do anything about it.  She does not like feeling this way.        5/12/2024   General Health   How would you rate your overall physical health? (!) FAIR   Feel stress (tense, anxious, or unable to sleep) Very much   (!) STRESS CONCERN      5/12/2024    Nutrition   Diet: Diabetic         5/12/2024   Exercise   Days per week of moderate/strenous exercise 6 days   Average minutes spent exercising at this level 20 min         5/12/2024   Social Factors   Frequency of gathering with friends or relatives Once a week   Worry food won't last until get money to buy more No   Food not last or not have enough money for food? No   Do you have housing?  Yes   Are you worried about losing your housing? No   Lack of transportation? No   Unable to get utilities (heat,electricity)? No         5/14/2024   Fall Risk   Gait Speed Test (Document in seconds) 3.5   Gait Speed Test Interpretation Less than or equal to 5.00 seconds - PASS          5/12/2024   Activities of Daily Living- Home Safety   Needs help with the following daily activites None of the above   Safety concerns in the home None of the above         5/12/2024   Dental   Dentist two times every year? Yes         5/12/2024   Hearing Screening   Hearing concerns? None of the above         5/12/2024   Driving Risk Screening   Patient/family members have concerns about driving No         5/12/2024   General Alertness/Fatigue Screening   Have you been more tired than usual lately? No         5/12/2024   Urinary Incontinence Screening   Bothered by leaking urine in past 6 months No         5/12/2024   TB Screening   Were you born outside of the [place]? No       Today's PHQ-9 Score:       5/14/2024    10:24 AM   PHQ-9 SCORE   PHQ-9 Total Score MyChart 24 (Severe depression)   PHQ-9 Total Score 24         5/12/2024   Substance Use   Alcohol more than 3/day or more than 7/wk Not Applicable   Do you have a current opioid prescription? No   How severe/bad is pain from 1 to 10? 0/10 (No Pain)   Do you use any other substances recreationally? No     Social History     Tobacco Use    Smoking status: Never    Smokeless tobacco: Never    Tobacco comments:     no smokers in household   Vaping Use    Vaping status: Never Used   Substance  Use Topics    Alcohol use: No    Drug use: No           3/14/2024   LAST FHS-7 RESULTS   1st degree relative breast or ovarian cancer Yes   Any relative bilateral breast cancer No   Any male have breast cancer No   Any ONE woman have BOTH breast AND ovarian cancer No   Any woman with breast cancer before 50yrs No   2 or more relatives with breast AND/OR ovarian cancer No   2 or more relatives with breast AND/OR bowel cancer No        Mammogram Screening - Mammogram every 1-2 years updated in Health Maintenance based on mutual decision making    ASCVD Risk   The 10-year ASCVD risk score (Latasha MELO, et al., 2019) is: 19.7%    Values used to calculate the score:      Age: 73 years      Sex: Female      Is Non- : No      Diabetic: Yes      Tobacco smoker: No      Systolic Blood Pressure: 124 mmHg      Is BP treated: No      HDL Cholesterol: 42 mg/dL      Total Cholesterol: 151 mg/dL            Reviewed and updated as needed this visit by Provider   Tobacco  Allergies  Meds  Problems  Med Hx  Surg Hx  Fam Hx          Current providers sharing in care for this patient include:  Patient Care Team:  Precious Hay MD as PCP - General  Precious Hay MD as Assigned PCP  Miles Hicks MD as MD (Dermatology)  Amy Jesus, RN as Diabetes Educator (Diabetes Education)  Autumn Hanson RD as Diabetes Educator (Nutrition)    The following health maintenance items are reviewed in Epic and correct as of today:  Health Maintenance   Topic Date Due    RSV VACCINE (Pregnancy & 60+) (1 - 1-dose 60+ series) Never done    ZOSTER IMMUNIZATION (2 of 3) 05/01/2013    ASTHMA ACTION PLAN  12/14/2017    DTAP/TDAP/TD IMMUNIZATION (3 - Td or Tdap) 03/06/2023    COVID-19 Vaccine (1 - 2023-24 season) Never done    A1C  07/11/2024    LIPID  07/16/2024    INFLUENZA VACCINE (Season Ended) 09/01/2024    ASTHMA CONTROL TEST  09/13/2024    EYE EXAM  01/05/2025    ANNUAL REVIEW OF   "ORDERS  03/13/2025    BMP  04/12/2025    COLORECTAL CANCER SCREENING  04/12/2025    MICROALBUMIN  04/16/2025    DIABETIC FOOT EXAM  04/16/2025    MEDICARE ANNUAL WELLNESS VISIT  05/14/2025    FALL RISK ASSESSMENT  05/14/2025    PHQ-9  05/14/2025    MAMMO SCREENING  03/14/2026    ADVANCE CARE PLANNING  04/11/2028    DEXA  05/08/2034    HEPATITIS C SCREENING  Completed    DEPRESSION ACTION PLAN  Completed    Pneumococcal Vaccine: 65+ Years  Completed    IPV IMMUNIZATION  Aged Out    HPV IMMUNIZATION  Aged Out    MENINGITIS IMMUNIZATION  Aged Out    RSV MONOCLONAL ANTIBODY  Aged Out          Objective    Exam  /70 (BP Location: Left arm, Patient Position: Sitting, Cuff Size: Adult Regular)   Pulse 77   Temp 97.4  F (36.3  C) (Temporal)   Resp 16   Ht 1.626 m (5' 4\")   Wt 88.5 kg (195 lb)   LMP 01/01/2005   SpO2 96%   BMI 33.47 kg/m     Estimated body mass index is 33.47 kg/m  as calculated from the following:    Height as of this encounter: 1.626 m (5' 4\").    Weight as of this encounter: 88.5 kg (195 lb).    Physical Exam  Constitutional:       General: She is not in acute distress.     Appearance: She is well-developed.   HENT:      Right Ear: Tympanic membrane and external ear normal.      Left Ear: Tympanic membrane and external ear normal.      Nose: Nose normal.   Eyes:      General:         Right eye: No discharge.         Left eye: No discharge.      Conjunctiva/sclera: Conjunctivae normal.   Neck:      Thyroid: No thyroid mass.   Cardiovascular:      Rate and Rhythm: Normal rate and regular rhythm.      Heart sounds: Normal heart sounds, S1 normal and S2 normal. No murmur heard.  Pulmonary:      Effort: Pulmonary effort is normal. No respiratory distress.      Breath sounds: Normal breath sounds. No wheezing or rales.   Abdominal:      General: Bowel sounds are normal.      Palpations: Abdomen is soft. There is no mass.      Tenderness: There is no abdominal tenderness.   Musculoskeletal:         " General: Normal range of motion.      Cervical back: Neck supple.   Lymphadenopathy:      Cervical: No cervical adenopathy.   Skin:     General: Skin is warm and dry.      Findings: No rash.   Neurological:      Mental Status: She is alert and oriented to person, place, and time.   Psychiatric:         Mood and Affect: Mood is anxious.               5/14/2024   Mini Cog   Clock Draw Score 2 Normal   3 Item Recall 3 objects recalled   Mini Cog Total Score 5              Signed Electronically by: Precious Hay MD    Answers submitted by the patient for this visit:  Patient Health Questionnaire (Submitted on 5/14/2024)  If you checked off any problems, how difficult have these problems made it for you to do your work, take care of things at home, or get along with other people?: Extremely difficult  PHQ9 TOTAL SCORE: 24

## 2024-05-14 NOTE — PATIENT INSTRUCTIONS
"Preventive Care Advice   This is general advice we often give to help people stay healthy. Your care team may have specific advice just for you. Please talk to your care team about your own preventive care needs.  Lifestyle  Exercise at least 150 minutes each week (30 minutes a day, 5 days a week).  Do muscle strengthening activities 2 days a week. These help control your weight and prevent disease.  No smoking.  Wear sunscreen to prevent skin cancer.  Have your home tested for radon every 2 to 5 years. Radon is a colorless, odorless gas that can harm your lungs. To learn more, go to www.health.[facility].mn. and search for \"Radon in Homes.\"  Keep guns unloaded and locked up in a safe place like a safe or gun vault, or, use a gun lock and hide the keys. Always lock away bullets separately. To learn more, visit [employer].[place].gov and search for \"safe gun storage.\"  Nutrition  Eat 5 or more servings of fruits and vegetables each day.  Try wheat bread, brown rice and whole grain pasta (instead of white bread, rice, and pasta).  Get enough calcium and vitamin D. Check the label on foods and aim for 100% of the RDA (recommended daily allowance).  Regular exams  Have a dental exam and cleaning every 6 months.  See your health care team every year to talk about:  Any changes in your health.  Any medicines your care team has prescribed.  Preventive care, family planning, and ways to prevent chronic diseases.  Shots (vaccines)   HPV shots (up to age 26), if you've never had them before.  Hepatitis B shots (up to age 59), if you've never had them before.  COVID-19 shot: Get this shot when it's due.  Flu shot: Get a flu shot every year.  Tetanus shot: Get a tetanus shot every 10 years.  Pneumococcal, hepatitis A, and RSV shots: Ask your care team if you need these based on your risk.  Shingles shot (for age 50 and up).  General health tests  Diabetes screening:  Starting at age 35, Get screened for diabetes at least every 3 years.  If " you are younger than age 35, ask your care team if you should be screened for diabetes.  Cholesterol test: At age 39, start having a cholesterol test every 5 years, or more often if advised.  Bone density scan (DEXA): At age 50, ask your care team if you should have this scan for osteoporosis (brittle bones).  Hepatitis C: Get tested at least once in your life.  Abdominal aortic aneurysm screening: Talk to your doctor about having this screening if you:  Have ever smoked; and  Are biologically male; and  Are between the ages of 65 and 75.  STIs (sexually transmitted infections)  Before age 24: Ask your care team if you should be screened for STIs.  After age 24: Get screened for STIs if you're at risk. You are at risk for STIs (including HIV) if:  You are sexually active with more than one person.  You don't use condoms every time.  You or a partner was diagnosed with a sexually transmitted infection.  If you are at risk for HIV, ask about PrEP medicine to prevent HIV.  Get tested for HIV at least once in your life, whether you are at risk for HIV or not.  Cancer screening tests  Cervical cancer screening: If you have a cervix, begin getting regular cervical cancer screening tests at age 21. Most people who have regular screenings with normal results can stop after age 65. Talk about this with your provider.  Breast cancer scan (mammogram): If you've ever had breasts, begin having regular mammograms starting at age 40. This is a scan to check for breast cancer.  Colon cancer screening: It is important to start screening for colon cancer at age 45.  Have a colonoscopy test every 10 years (or more often if you're at risk) Or, ask your provider about stool tests like a FIT test every year or Cologuard test every 3 years.  To learn more about your testing options, visit: www.[employer]/868695.pdf.  For help making a decision, visit: [email or web address].  Prostate cancer screening test: If you have a prostate and are age 55  to 69, ask your provider if you would benefit from a yearly prostate cancer screening test.  Lung cancer screening: If you are a current or former smoker age 50 to 80, ask your care team if ongoing lung cancer screenings are right for you.  For informational purposes only. Not to replace the advice of your health care provider. Copyright   2023 [employer]. All rights reserved. Clinically reviewed by the [facility]. [employer] 383132 - REV 04/24.    Preventing Falls: Care Instructions  Injuries and health problems such as trouble walking or poor eyesight can increase your risk of falling. So can some medicines. But there are things you can do to help prevent falls. You can exercise to get stronger. You can also arrange your home to make it safer.    Talk to your doctor about the medicines you take. Ask if any of them increase the risk of falls and whether they can be changed or stopped.   Try to exercise regularly. It can help improve your strength and balance. This can help lower your risk of falling.     Practice fall safety and prevention.    Wear low-heeled shoes that fit well and give your feet good support. Talk to your doctor if you have foot problems that make this hard.  Carry a cellphone or wear a medical alert device that you can use to call for help.  Use stepladders instead of chairs to reach high objects. Don't climb if you're at risk for falls. Ask for help, if needed.  Wear the correct eyeglasses, if you need them.    Make your home safer.    Remove rugs, cords, clutter, and furniture from walkways.  Keep your house well lit. Use night-lights in hallways and bathrooms.  Install and use sturdy handrails on stairways.  Wear nonskid footwear, even inside. Don't walk barefoot or in socks without shoes.    Be safe outside.    Use handrails, curb cuts, and ramps whenever possible.  Keep your hands free by using a shoulder bag or backpack.  Try to walk in well-lit  "areas. Watch out for uneven ground, changes in pavement, and debris.  Be careful in the winter. Walk on the grass or gravel when sidewalks are slippery. Use de-icer on steps and walkways. Add non-slip devices to shoes.    Put grab bars and nonskid mats in your shower or tub and near the toilet. Try to use a shower chair or bath bench when bathing.   Get into a tub or shower by putting in your weaker leg first. Get out with your strong side first. Have a phone or medical alert device in the bathroom with you.   Where can you learn more?  Go to https://www.[employer]/patiented  Enter G117 in the search box to learn more about \"Preventing Falls: Care Instructions.\"  Current as of: July 17, 2023               Content Version: 14.0    4493-9416 [employer].   Care instructions adapted under license by your healthcare professional. If you have questions about a medical condition or this instruction, always ask your healthcare professional. [employer] disclaims any warranty or liability for your use of this information.      Learning About Stress  What is stress?     Stress is your body's response to a hard situation. Your body can have a physical, emotional, or mental response. Stress is a fact of life for most people, and it affects everyone differently. What causes stress for you may not be stressful for someone else.  A lot of things can cause stress. You may feel stress when you go on a job interview, take a test, or run a race. This kind of short-term stress is normal and even useful. It can help you if you need to work hard or react quickly. For example, stress can help you finish an important job on time.  Long-term stress is caused by ongoing stressful situations or events. Examples of long-term stress include long-term health problems, ongoing problems at work, or conflicts in your family. Long-term stress can harm your health.  How does stress affect your health?  When you are " stressed, your body responds as though you are in danger. It makes hormones that speed up your heart, make you breathe faster, and give you a burst of energy. This is called the fight-or-flight stress response. If the stress is over quickly, your body goes back to normal and no harm is done.  But if stress happens too often or lasts too long, it can have bad effects. Long-term stress can make you more likely to get sick, and it can make symptoms of some diseases worse. If you tense up when you are stressed, you may develop neck, shoulder, or low back pain. Stress is linked to high blood pressure and heart disease.  Stress also harms your emotional health. It can make you james, tense, or depressed. Your relationships may suffer, and you may not do well at work or school.  What can you do to manage stress?  You can try these things to help manage stress:   Do something active. Exercise or activity can help reduce stress. Walking is a great way to get started. Even everyday activities such as housecleaning or yard work can help.  Try yoga or jose chi. These techniques combine exercise and meditation. You may need some training at first to learn them.  Do something you enjoy. For example, listen to music or go to a movie. Practice your hobby or do volunteer work.  Meditate. This can help you relax, because you are not worrying about what happened before or what may happen in the future.  Do guided imagery. Imagine yourself in any setting that helps you feel calm. You can use online videos, books, or a teacher to guide you.  Do breathing exercises. For example:  From a standing position, bend forward from the waist with your knees slightly bent. Let your arms dangle close to the floor.  Breathe in slowly and deeply as you return to a standing position. Roll up slowly and lift your head last.  Hold your breath for just a few seconds in the standing position.  Breathe out slowly and bend forward from the waist.  Let your  "feelings out. Talk, laugh, cry, and express anger when you need to. Talking with supportive friends or family, a counselor, or a hank leader about your feelings is a healthy way to relieve stress. Avoid discussing your feelings with people who make you feel worse.  Write. It may help to write about things that are bothering you. This helps you find out how much stress you feel and what is causing it. When you know this, you can find better ways to cope.  What can you do to prevent stress?  You might try some of these things to help prevent stress:  Manage your time. This helps you find time to do the things you want and need to do.  Get enough sleep. Your body recovers from the stresses of the day while you are sleeping.  Get support. Your family, friends, and community can make a difference in how you experience stress.  Limit your news feed. Avoid or limit time on social media or news that may make you feel stressed.  Do something active. Exercise or activity can help reduce stress. Walking is a great way to get started.  Where can you learn more?  Go to https://www.[employer].net/patiented  Enter N032 in the search box to learn more about \"Learning About Stress.\"  Current as of: October 24, 2023               Content Version: 14.0    [id number] [employer].   Care instructions adapted under license by your healthcare professional. If you have questions about a medical condition or this instruction, always ask your healthcare professional. [employer] disclaims any warranty or liability for your use of this information.      Learning About Depression Screening  What is depression screening?  Depression screening is a way to see if you have depression symptoms. It may be done by a doctor or counselor. It's often part of a routine checkup. That's because your mental health is just as important as your physical health.  Depression is a mental health condition that affects how you feel, " "think, and act. You may:  Have less energy.  Lose interest in your daily activities.  Feel sad and grouchy for a long time.  Depression is very common. It affects people of all ages.  Many things can lead to depression. Some people become depressed after they have a stroke or find out they have a major illness like cancer or heart disease. The death of a loved one or a breakup may lead to depression. It can run in families. Most experts believe that a combination of inherited genes and stressful life events can cause it.  What happens during screening?  You may be asked to fill out a form about your depression symptoms. You and the doctor will discuss your answers. The doctor may ask you more questions to learn more about how you think, act, and feel.  What happens after screening?  If you have symptoms of depression, your doctor will talk to you about your options.  Doctors usually treat depression with medicines or counseling. Often, combining the two works best. Many people don't get help because they think that they'll get over the depression on their own. But people with depression may not get better unless they get treatment.  The cause of depression is not well understood. There may be many factors involved. But if you have depression, it's not your fault.  A serious symptom of depression is thinking about death or suicide. If you or someone you care about talks about this or about feeling hopeless, get help right away.  It's important to know that depression can be treated. Medicine, counseling, and self-care may help.  Where can you learn more?  Go to https://www.[employer].net/patiented  Enter T185 in the search box to learn more about \"Learning About Depression Screening.\"  Current as of: June 24, 2023               Content Version: 14.0    0923-9855 [employer].   Care instructions adapted under license by your healthcare professional. If you have questions about a medical condition or this " instruction, always ask your healthcare professional. [employer] disclaims any warranty or liability for your use of this information.

## 2024-06-07 ENCOUNTER — VIRTUAL VISIT (OUTPATIENT)
Dept: BEHAVIORAL HEALTH | Facility: CLINIC | Age: 74
End: 2024-06-07
Payer: COMMERCIAL

## 2024-06-07 DIAGNOSIS — F33.9 EPISODE OF RECURRENT MAJOR DEPRESSIVE DISORDER, UNSPECIFIED DEPRESSION EPISODE SEVERITY (H): Primary | ICD-10-CM

## 2024-06-07 DIAGNOSIS — F41.9 ANXIETY: ICD-10-CM

## 2024-06-07 PROCEDURE — 90791 PSYCH DIAGNOSTIC EVALUATION: CPT | Mod: 95 | Performed by: PSYCHOLOGIST

## 2024-06-07 ASSESSMENT — ANXIETY QUESTIONNAIRES
2. NOT BEING ABLE TO STOP OR CONTROL WORRYING: SEVERAL DAYS
1. FEELING NERVOUS, ANXIOUS, OR ON EDGE: SEVERAL DAYS
IF YOU CHECKED OFF ANY PROBLEMS ON THIS QUESTIONNAIRE, HOW DIFFICULT HAVE THESE PROBLEMS MADE IT FOR YOU TO DO YOUR WORK, TAKE CARE OF THINGS AT HOME, OR GET ALONG WITH OTHER PEOPLE: SOMEWHAT DIFFICULT
7. FEELING AFRAID AS IF SOMETHING AWFUL MIGHT HAPPEN: SEVERAL DAYS
6. BECOMING EASILY ANNOYED OR IRRITABLE: NOT AT ALL
7. FEELING AFRAID AS IF SOMETHING AWFUL MIGHT HAPPEN: SEVERAL DAYS
3. WORRYING TOO MUCH ABOUT DIFFERENT THINGS: SEVERAL DAYS
GAD7 TOTAL SCORE: 6
5. BEING SO RESTLESS THAT IT IS HARD TO SIT STILL: SEVERAL DAYS
GAD7 TOTAL SCORE: 6
7. FEELING AFRAID AS IF SOMETHING AWFUL MIGHT HAPPEN: SEVERAL DAYS
8. IF YOU CHECKED OFF ANY PROBLEMS, HOW DIFFICULT HAVE THESE MADE IT FOR YOU TO DO YOUR WORK, TAKE CARE OF THINGS AT HOME, OR GET ALONG WITH OTHER PEOPLE?: SOMEWHAT DIFFICULT
2. NOT BEING ABLE TO STOP OR CONTROL WORRYING: SEVERAL DAYS
7. FEELING AFRAID AS IF SOMETHING AWFUL MIGHT HAPPEN: SEVERAL DAYS
GAD7 TOTAL SCORE: 6
6. BECOMING EASILY ANNOYED OR IRRITABLE: NOT AT ALL
GAD7 TOTAL SCORE: 6
8. IF YOU CHECKED OFF ANY PROBLEMS, HOW DIFFICULT HAVE THESE MADE IT FOR YOU TO DO YOUR WORK, TAKE CARE OF THINGS AT HOME, OR GET ALONG WITH OTHER PEOPLE?: SOMEWHAT DIFFICULT
3. WORRYING TOO MUCH ABOUT DIFFERENT THINGS: SEVERAL DAYS
5. BEING SO RESTLESS THAT IT IS HARD TO SIT STILL: SEVERAL DAYS
IF YOU CHECKED OFF ANY PROBLEMS ON THIS QUESTIONNAIRE, HOW DIFFICULT HAVE THESE PROBLEMS MADE IT FOR YOU TO DO YOUR WORK, TAKE CARE OF THINGS AT HOME, OR GET ALONG WITH OTHER PEOPLE: SOMEWHAT DIFFICULT
GAD7 TOTAL SCORE: 6
4. TROUBLE RELAXING: SEVERAL DAYS
4. TROUBLE RELAXING: SEVERAL DAYS
1. FEELING NERVOUS, ANXIOUS, OR ON EDGE: SEVERAL DAYS
GAD7 TOTAL SCORE: 6

## 2024-06-07 ASSESSMENT — COLUMBIA-SUICIDE SEVERITY RATING SCALE - C-SSRS
6. HAVE YOU EVER DONE ANYTHING, STARTED TO DO ANYTHING, OR PREPARED TO DO ANYTHING TO END YOUR LIFE?: NO
2. HAVE YOU ACTUALLY HAD ANY THOUGHTS OF KILLING YOURSELF?: NO
TOTAL  NUMBER OF INTERRUPTED ATTEMPTS LIFETIME: NO
ATTEMPT LIFETIME: NO
1. IN THE PAST MONTH, HAVE YOU WISHED YOU WERE DEAD OR WISHED YOU COULD GO TO SLEEP AND NOT WAKE UP?: YES
1. HAVE YOU WISHED YOU WERE DEAD OR WISHED YOU COULD GO TO SLEEP AND NOT WAKE UP?: YES
TOTAL  NUMBER OF ABORTED OR SELF INTERRUPTED ATTEMPTS LIFETIME: NO

## 2024-06-07 ASSESSMENT — PATIENT HEALTH QUESTIONNAIRE - PHQ9
SUM OF ALL RESPONSES TO PHQ QUESTIONS 1-9: 8
10. IF YOU CHECKED OFF ANY PROBLEMS, HOW DIFFICULT HAVE THESE PROBLEMS MADE IT FOR YOU TO DO YOUR WORK, TAKE CARE OF THINGS AT HOME, OR GET ALONG WITH OTHER PEOPLE: SOMEWHAT DIFFICULT
SUM OF ALL RESPONSES TO PHQ QUESTIONS 1-9: 8
SUM OF ALL RESPONSES TO PHQ QUESTIONS 1-9: 8
10. IF YOU CHECKED OFF ANY PROBLEMS, HOW DIFFICULT HAVE THESE PROBLEMS MADE IT FOR YOU TO DO YOUR WORK, TAKE CARE OF THINGS AT HOME, OR GET ALONG WITH OTHER PEOPLE: SOMEWHAT DIFFICULT
SUM OF ALL RESPONSES TO PHQ QUESTIONS 1-9: 8

## 2024-06-07 NOTE — PROGRESS NOTES
"    [facility]         PATIENT'S NAME: Bharath Kennedy  PREFERRED NAME: Bharath  PRONOUNS:   she / her    MRN: [id number]  : 1950  ADDRESS: [address] [place] [id number]  ACCT. NUMBER:  [id number]  DATE OF SERVICE: 24  START TIME: 1501  END TIME: 1600  PREFERRED PHONE: [phone]  May we leave a program related message: Yes  EMERGENCY CONTACT: was obtained - Maria Dolores (daughter)  .  SERVICE MODALITY:  Video Visit:    59 min   Provider verified identity through the following two step process.  Patient provided:  Patient  and Patient address    Telemedicine Visit: The patient's condition can be safely assessed and treated via synchronous audio and visual telemedicine encounter.      Reason for Telemedicine Visit: Patient convenience (e.g. access to timely appointments / distance to available provider)    Originating Site (Patient Location): Patient's home    Distant Site (Provider Location): [facility] [place]    Consent:  The patient/guardian has verbally consented to: the potential risks and benefits of telemedicine (video visit) versus in person care; bill my insurance or make self-payment for services provided; and responsibility for payment of non-covered services.     Patient would like the video invitation sent by:  My Chart    Mode of Communication:  Video Conference via Amwell    Distant Location (Provider):  On-site    As the provider I attest to compliance with applicable laws and regulations related to telemedicine.    UNIVERSAL ADULT Mental Health DIAGNOSTIC ASSESSMENT    Identifying Information:  Patient is a 74 year old,   individual.  Patient was referred for an assessment by  psychiatristAntonia at [facility] and family.  Patient attended the session alone.    Chief Complaint:   The reason for seeking services at this time is: \"outpatient hospital group\".  The problem(s) began 24.  The patient " "was initially diagnosed with depression and anxiety in 1986.   She reported medication has been helpful but her depression and anxiety increased in April 2024 after she was diagnosed with Covid.    The patient stated that her psychiatrist believes that COVID  may have contributed to the increase in her mental health symptoms.      She was at [facility] from 5/29/2024-6/3/2024.  Her medication was changed in the hospital and this has been somewhat helpful per the patient's report.      She was first diagnosed with anxiety and depression in 1986.    She has been on Celexa since 2006.   She has been hospitalized at [facility] twice in the past.  She has managed her symptoms relatively well over the years until recently.      Patient has attempted to resolve these concerns in the past through psychiatry, medication management, hospitalization .  She does not have an individual therapist.    Social/Family History:  Patient reported they grew up in [place], [place].  They were raised by biological parents. Parents were always together.  Patient reported that their childhood was \"average.  I had very hardworking berger parents.  They were not abusive.  I wasn't their favorite, I was the middle child.\"  Patient described their current relationships with family of origin as : both parents are no longer living.     She has two siblings.  She used to be close to her brother but over the past five year there have been some issues.   Patient stated she is close to her sister but she can't call her for support.  Both of her siblings are anti medicine and this can caused some problems.     The patient describes their cultural background as .  Cultural influences and impact on patient's life structure, values, norms, and healthcare: Adventist.  Contextual influences on patient's health include: Contextual Factors: Individual Factors physical health problems .    These factors will be addressed in the Preliminary Treatment " plan. Patient identified their preferred language to be English. Patient reported they does not need the assistance of an  or other support involved in therapy.     Patient reported had no significant delays in developmental tasks.   Patient's highest education level was some college. Patient identified the following learning problems: none reported.  Modifications will not be used to assist communication in therapy.  Patient reports they are  able to understand written materials.    Patient reported the following relationship history:  Patient's current relationship status is  for 19 years.   The patient was engaged a few times after the death of her spouse but has not dated for about 15 years.  Patient identified their sexual orientation as heterosexual.  Patient reported having 3 child(delaney), ages 50, 46 and 45. Patient identified adult child and one good friend as part of their support system.  Patient identified the quality of these relationships as good.    Patient's current living/housing situation involves staying in own home/apartment.  The immediate members of family and household include the patient and her 46 yo son  and they report that housing is stable.    Patient is currently retired.  Patient reports their finances are obtained through other. Patient does not identify finances as a current stressor.      Patient reported that they have not been involved with the legal system. Patient does not report being under probation/ parole/ jurisdiction. They are not under any current court jurisdiction. .    Patient's Strengths and Limitations:  Patient identified the following strengths or resources that will help them succeed in treatment: Latter day / Mormonism, hank / spirituality, friends / good social support, family support, and motivation. Things that may interfere with the patient's success in treatment include:  distance to program .     Assessments:  The following assessments were  completed by patient for this visit:  PHQ9:       1/20/2023     1:00 PM 4/11/2023    10:41 AM 4/11/2023    10:57 AM 3/13/2024    10:10 AM 4/19/2024     3:37 PM 5/14/2024    10:24 AM 6/7/2024     1:14 PM   PHQ-9 SCORE   PHQ-9 Total Score MyChart 0  0 3 (Minimal depression) 9 (Mild depression) 24 (Severe depression) 8 (Mild depression)   PHQ-9 Total Score 0 0 0 3 9 24 8    8     GAD7:       5/8/2020     8:45 AM 4/27/2021    10:35 AM 3/25/2022     1:47 PM 4/11/2023    10:41 AM 4/11/2023    10:57 AM 4/19/2024     3:33 PM 6/7/2024     1:28 PM   SAGE-7 SCORE   Total Score  2 (minimal anxiety)   0 (minimal anxiety) 8 (mild anxiety) 6 (mild anxiety)   Total Score 0 2 0 0 0 8 6    6     CAGE-AID:       6/7/2024     1:31 PM   CAGE-AID Total Score   Total Score 0   Total Score MyChart 0 (A total score of 2 or greater is considered clinically significant)     PROMIS 10-Global Health (all questions and answers displayed):       6/7/2024     1:30 PM   PROMIS 10   In general, would you say your health is: Fair   In general, would you say your quality of life is: Fair   In general, how would you rate your physical health? Fair   In general, how would you rate your mental health, including your mood and your ability to think? Fair   In general, how would you rate your satisfaction with your social activities and relationships? Fair   In general, please rate how well you carry out your usual social activities and roles Fair   To what extent are you able to carry out your everyday physical activities such as walking, climbing stairs, carrying groceries, or moving a chair? Mostly   In the past 7 days, how often have you been bothered by emotional problems such as feeling anxious, depressed, or irritable? Often   In the past 7 days, how would you rate your fatigue on average? None   In the past 7 days, how would you rate your pain on average, where 0 means no pain, and 10 means worst imaginable pain? 7   In general, would you say your  health is: 2   In general, would you say your quality of life is: 2   In general, how would you rate your physical health? 2   In general, how would you rate your mental health, including your mood and your ability to think? 2   In general, how would you rate your satisfaction with your social activities and relationships? 2   In general, please rate how well you carry out your usual social activities and roles. (This includes activities at home, at work and in your community, and responsibilities as a parent, child, spouse, employee, friend, etc.) 2   To what extent are you able to carry out your everyday physical activities such as walking, climbing stairs, carrying groceries, or moving a chair? 4   In the past 7 days, how often have you been bothered by emotional problems such as feeling anxious, depressed, or irritable? 4   In the past 7 days, how would you rate your fatigue on average? 1   In the past 7 days, how would you rate your pain on average, where 0 means no pain, and 10 means worst imaginable pain? 7   Global Mental Health Score 8   Global Physical Health Score 13   PROMIS TOTAL - SUBSCORES 21     [place] Suicide Severity Rating Scale (Lifetime/Recent)      2024     3:00 PM   [place] Suicide Severity Rating (Lifetime/Recent)   Q1 Wish to be Dead (Lifetime) Y   1. Wish to be Dead (Past 1 Month) Y   Q2 Non-Specific Active Suicidal Thoughts (Lifetime) N   Actual Attempt (Lifetime) N   Has subject engaged in non-suicidal self-injurious behavior? (Lifetime) N   Interrupted Attempts (Lifetime) N   Aborted or Self-Interrupted Attempt (Lifetime) N   Preparatory Acts or Behavior (Lifetime) N   Calculated C-SSRS Risk Score (Lifetime/Recent) Low Risk     LOCUS Worksheet:   LOCUS Worksheet     Name: Bharath Kennedy MRN: [id number]    : 1950      Gender:  female    PMI:     Provider Name: Shweta Burton Psy.D, LP     Provider NPI:  [id number]    Actual level of Care Provided:  Diagnostic  Assessment     Service(s) receiving or referred to:  IOP 55+    Reason for Variance:       Rating completed by: Shweta Burton Psy.D, LP        I. Risk of Harm:   2      Low Risk of Harm    II. Functional Status:   3      Moderate Impairment    III. Co-Morbidity:   3      Significant Co-Morbidity    IV - A. Recovery Environment - Level of Stress:   3      Moderately Stress Environment    IV - B. Recovery Environment - Level of Support:   2      Supportive Environment    V. Treatment and Recovery History:   3      Moderate to Equivocal Response to Treatment and Recovery Management    VI. Engagement and Recovery Project:   2      Positive Engagement and Recovery       18 Composite Score    Level of Care Recommendation:   17 to 19       High Intensity Community Based Services    Personal and Family Medical History:  Patient does report a family history of mental health concerns.  Patient reports family history includes Anxiety Disorder in her brother and sister; Breast Cancer in her mother; C.A.D. in her mother; Cancer in her mother; Cerebrovascular Disease in her mother; Depression in her sister; Diabetes in her brother, maternal half-sister, mother, sister, sister, and another family member; Obesity in her brother, maternal half-sister, mother, niece, and sister; Prostate Cancer in her father; Thyroid Disease in her mother..     Patient does not report Mental Health Diagnosis or Treatment.      Patient has had a physical exam to rule out medical causes for current symptoms.  Date of last physical exam was within the past year. Client was encouraged to follow up with PCP if symptoms were to develop. The patient has a [place] Primary Care Provider, who is named Precious Hay.   Patient reports no current medical concerns and no current dental concerns.  Patient denies any issues with pain.  There are significant appetite / nutritional concerns / weight changes.  The patient reported a decrease in  appetite related to depression.   Patient does not report a history of head injury / trauma / cognitive impairment.      Patient reports current meds as:   Current Outpatient Medications   Medication Sig Dispense Refill    albuterol (PROAIR HFA/PROVENTIL HFA/VENTOLIN HFA) 108 (90 Base) MCG/ACT inhaler Inhale 2 puffs into the lungs every 6 hours as needed for shortness of breath, wheezing or cough (Patient not taking: Reported on 4/16/2024) 18 g 0    atorvastatin (LIPITOR) 20 MG tablet Take 1 tablet (20 mg) by mouth daily 90 tablet 3    busPIRone (BUSPAR) 5 MG tablet Take 1 tablet (5 mg) by mouth 2 times daily 60 tablet 1    citalopram (CELEXA) 20 MG tablet Take 1 tablet (20 mg) by mouth daily 90 tablet 3    SUMAtriptan (IMITREX) 50 MG tablet Take 1 tablet (50 mg) by mouth at onset of headache for migraine (Patient not taking: Reported on 3/13/2024) 18 tablet 3     No current facility-administered medications for this visit.       Medication Adherence:  Patient reports taking.  taking prescribed medications as prescribed.    Patient Allergies:    Allergies   Allergen Reactions    Amoxicillin      fever    Iodinated Contrast Media Hives     Responded to IV benadryl but also became very sedated on benadryl    Nitrofurantoin      Fever at noc    Penicillins      Rash/itch         Medical History:    Past Medical History:   Diagnosis Date    Acne vulgaris     Arthritis 2022    Depressive disorder     Depressive disorder, not elsewhere classified     Diabetes mellitus, type 2 (H) 04/11/2024    Displacement of intervertebral disc, site unspecified, without myelopathy     L5-S1     Moderate persistent asthma without complication 12/14/2016    Obesity, unspecified          Current Mental Status Exam:   Appearance:  Appropriate    Eye Contact:  Good   Psychomotor:  Normal       Gait / station:  no problem  Attitude / Demeanor: Cooperative  Pleasant  Speech      Rate / Production: Normal/ Responsive      Volume:  Normal   volume      Language:  intact  Mood:   Depressed   Affect:   Appropriate    Thought Content: Clear   Thought Process: Coherent       Associations: No loosening of associations  Insight:   Good   Judgment:  Intact   Orientation:  All  Attention/concentration: Good    Substance Use:   Patient did not report a family history of substance use concerns; see medical history section for details.  Patient has not received chemical dependency treatment in the past.  Patient has not ever been to detox.      Patient is not currently receiving any chemical dependency treatment.           Substance History of use Age of first use Date of last use     Pattern and duration of use (include amounts and frequency)   Alcohol never used          Cannabis   never used          Amphetamines   never used        Cocaine/crack    never used          Hallucinogens never used            Inhalants never used            Heroin never used            Other Opiates never used        Benzodiazepine   never used        Barbiturates never used        Over the counter meds never used        Caffeine never used        Nicotine  never used        Other substances not listed above:  Identify:  never used          Patient reported the following problems as a result of their substance use: no problems, not applicable.    Substance Use: No symptoms    Based on the negative CAGE score and clinical interview there  are not indications of drug or alcohol abuse.    Significant Losses / Trauma / Abuse / Neglect Issues:   Patient did not  serve in the .  There are indications or report of significant loss, trauma, abuse or neglect issues related to: are indications or report of significant loss, trauma, abuse or neglect issues related to loss of mom and dad, spouse (  19 years ago), sister ( last year), Godson (  4 years ago)  Concerns for possible neglect are not present.     Safety Assessment:   Patient denies current homicidal ideation and  behaviors.  Patient denies current self-injurious ideation and behaviors.    Patient denied risk behaviors associated with substance use.   Patient denies any high risk behaviors associated with mental health symptoms.  Patient reports the following current concerns for their personal safety: None.  Patient reports there are not firearms in the house.           History of Safety Concerns:  Patient denied a history of homicidal ideation.     Patient denied a history of personal safety concerns.    Patient denied a history of assaultive behaviors.    Patient denied a history of sexual assault behaviors.     Patient denied a history of risk behaviors associated with substance use.  Patient denies any history of high risk behaviors associated with mental health symptoms.  Patient reports the following protective factors: forward or future oriented thinking; dedication to family or friends; safe and stable environment; regular sleep; regular physical activity; help seeking behaviors when distressed; agreement to use safety plan; uses community crisis resources    Risk Plan:  See Recommendations for Safety and Risk Management Plan    Review of Symptoms per patient report:   Depression: Change in sleep, Lack of interest, Excessive or inappropriate guilt, Difficulties concentrating, Psychomotor slowing or agitation, Feelings of hopelessness, Feelings of helplessness, Ruminations, Feeling sad, down, or depressed, and Withdrawn  Lizzy:  No Symptoms  Psychosis: No Symptoms  Anxiety: Excessive worry, Nervousness, Physical complaints, such as headaches, stomachaches, muscle tension, Sleep disturbance, Psychomotor agitation, and Ruminations  Panic:  Was having panic attacks prior to initiating Remeron in the hospital last week  Post Traumatic Stress Disorder:  No Symptoms   Eating Disorder: No Symptoms  ADD / ADHD:  Forgetful and Restlessness/fidgety  Conduct Disorder: No symptoms  Autism Spectrum Disorder: No symptoms  Obsessive  Compulsive Disorder: No Symptoms    Patient reports the following compulsive behaviors and treatment history: none      Diagnostic Criteria:   Unspecified Anxiety Disorder , Symptoms characteristic of an anxiety disorder that caused clinically significant distress or impairment in social, occupational, or other important areas of functioning predominate but do not meet the full criteria for any of the disorders of the anxiety disorders diagnostic class.    Unspecified Depressive Disorder      Functional Status:  Patient reports the following functional impairments:  management of the household and or completion of tasks, organization, relationship(s), self-care, and social interactions.     Programmatic care:  Current LOCUS was assigned and patient needs the following level of care based on score 18  .    Clinical Summary:  1. Psychosocial, Cultural and Contextual Factors: covid 4/2024  .  2. Principal DSM5 Diagnoses  (Sustained by DSM5 Criteria Listed Above):   311 (F32.9) Unspecified Depressive Disorder   300.00 (F41.9) Unspecified Anxiety Disorder.  3. Other Diagnoses that is relevant to services:   n/a .  4. Provisional Diagnosis:  none    5. Prognosis: Return to Baseline Functioning, Expect Improvement, and Relieve Acute Symptoms.  6. Likely consequences of symptoms if not treated: decline in functioning.  7. Client strengths include:  caring, empathetic, goal-focused, motivated, open to learning, responsible parent, support of family, friends and providers, wants to learn, willing to ask questions, and willing to relate to others .     Recommendations:     1. Plan for Safety and Risk Management:   Safety and Risk: Recommended that patient call 911 or go to the local ED should there be a change in any of these risk factors.  The patient is adamant that she would not and has no intention to harm herself or anyone else         Report to child / adult protection services was NA.     2. Patient's did not  identify  health or cultural concerns to be addressed in treatment    3. Initial Treatment will focus on: reduction of depression and anxiety symptoms  .     4. Resources/Service Plan:    services are not indicated.   Modifications to assist communication are not indicated.   Additional disability accommodations are not indicated.      5. Collaboration:   Collaboration / coordination of treatment will be initiated with the following  support professionals: consider coordination of care with the patient's psychiatrist at [facility].      6.  Referrals:   The following referral(s) will be initiated: 55+ Senior Outpatient Program.       A Release of Information has been obtained for the following: outpatient therapist.     Clinical Substantiation/medical necessity for the above recommendations:  The patient is a 75 yo female referred for a diagnostic assessment by her primary care provider, family and ED following recent visit.  The patient has a long history of depression and anxiety, which she has managed relatively well over the course of several decades.  Her symptoms increased in April 2024 and are affecting her usual functioning.    IOP is recommended to assist with symptom management, improved functioning and to prevent further decline.   The patient meets the criteria for IOP based on her presentation, information obtained as part of the diagnostic assessment and LOCUS score.      7. ANETTE:    ANETTE:  did not discuss, no concerns related to ANETTE abuse or dependence  8. Records:   These were reviewed at time of assessment.   Information in this assessment was obtained from the medical record and  provided by patient who is a good historian.    Patient will have open access to their mental health medical record.    9.   Interactive Complexity: No    10. Safety Plan: [facility] Safety Plan  Patient Name: Bharath Kennedy   YOB: 1950  Last Updated on: 6/3/2024 4:30 PM  Step 1: Warning signs (thoughts,  images, mood, behavior) that a suicidal crisis may be developing:  sweating  light headedness  Step 2: Internal coping strategies - Things I can do to take my mind off my problems or distract myself:  talk to myself that my body is in no realy danger  deep breathing exercise  go for a walk  keeping busy with activities I enjoy - knitting, making cards, crafts  Step 3: People and social settings that provide distraction:  Name:    Maria Dolores  Vadim Benavides    Places: outside, go for a walk   Step 4: People whom I can ask for help:  Name :Maria Dolores  [phone]  Juli   [phone]  Vadim   [phone]  Step 5: Professionals or agencies I can contact during a crisis:  Name Dr. edgar Bruno - [facility]  Dr. Freeman - [facility]  Peer Support: 5pm-9am 24 hours a days, 7  days a week, 365 days a year   mobile crisis unit  [phone]  Suicide Prevention Lifeline Phone: 3-113-063-GOPZ (5499)  Crisis Text Line: Text HOME to 509940  Step 6: Making the environment safe (plan to reduce access to lethal means):  Patient does not have access to firearms. Patient administers her own medications. Pateint does not have any worries  about managing her medications.  What is most important to me and worth living for?  Tate and enjoying my family. To help people where I am needed.  Bharath Kennedy (MR# [id number]) : 1950 Printed by [[id number]] at 6/3/2024 4:34 PM      Provider Name/ Credentials:  Shweta Burton Psy.D, LP    2024

## 2024-06-10 ENCOUNTER — ALLIED HEALTH/NURSE VISIT (OUTPATIENT)
Dept: EDUCATION SERVICES | Facility: CLINIC | Age: 74
End: 2024-06-10
Attending: FAMILY MEDICINE
Payer: COMMERCIAL

## 2024-06-10 DIAGNOSIS — E11.9 TYPE 2 DIABETES MELLITUS WITHOUT COMPLICATION, WITHOUT LONG-TERM CURRENT USE OF INSULIN (H): ICD-10-CM

## 2024-06-10 PROCEDURE — G0108 DIAB MANAGE TRN  PER INDIV: HCPCS

## 2024-06-10 NOTE — PROGRESS NOTES
"Diabetes Self-Management Education & Support    Bharath Kennedy presents today for education related to Type 2 diabetes    Patient is being treated with:  Diet  She is accompanied by Aubrie, daughter in law    Year of diagnosis: 4/2024  Referring provider:  Satnam  Living Situation: Home with adult son  Employment: Retired, Sub teaching    PATIENT CONCERNS/REASON FOR REFERRAL   Prefers to manage with diet and exercise, doesn't want complications    ASSESSMENT:    Taking Medication:     Current Diabetes Management per Patient:  Taking diabetes medications? no    Monitoring  Patient glucose self monitoring as follows: rarely  BG meter: Generic meter   BG results: not available     Patient's most recent   Lab Results   Component Value Date    A1C 7.2 04/11/2024    A1C 5.8 05/02/2018      Patient's A1C goal: <7.0    Activity: Walks 20 minutes per day    Healthy Eating:   Patient currently eats 3 meals, 0-1 snacks per day   Breakfast: 1 cup oatmeal, walnuts, blueberries, pineapple, greek yogurt  Lunch: Fried egg with spinach, Salad with chicken  Dinner: Turkey kiki, carrots, 1/4 yam  Snack: Chocolate chip cookies-almond flour, truvia, almonds  1% milk with pills, water throughout     Problem Solving:    Patient is at risk of hypoglycemia?: NO  Hospitalizations for hyper or hypoglycemia: No    Healthy Coping and Stress Management:   Sources of stress identified by patient: My health, Anxiety/Depression  Coping mechanisms identified by patient:  Other (please specify):  Medication      EDUCATION and INSTRUCTION PROVIDED AT THIS VISIT:    T2DM seen for comprehensive review at the request of Dr. Hay. She is accompanied by daughter-in-law, Aubrie. Bharath was diagnosed this past spring with A1C of 7.2. Since diagnosis, she is following low CHO diet and increased activity. Reports is down 20# since April. Aubrie feels T2 diagnosis has contributed to poor mental health; has become \"fixated\" on disease, feels patient " may have not been eating up until a few weeks ago.She has old meter at home but rarely checks. Reviewed and discussed the following topics: pathophysiology and progression of diagnosis, target blood sugar goals, effect that carbohydrates has on blood sugar, total carbohydrate in grams per meal goals, benefit of exercise, complications from chronic high blood sugars, and what can increase blood sugar values such as steroid and illness. Patient verbalized understanding of how to use meter. Prescriptions sent to pharmacy for new meter and supplies. We spent 50% of visit recalling diet, identifying CHO, and CHO goals. She will aim for 30 grams of carbs per meal. Follow up in July after A1C.     Education Materials Provided: Healthy Living with Diabetes, Guide to Counting Carbohydrates, Printed Handouts: My Plate Planner    Patient-stated goal written and given to Stacie BERNICE Kennedy.  Verbalized and demonstrated understanding of instructions.   See patient instructions  AVS printed and given to patient-via My Chart    PLAN:  *Check blood sugar 3 times per week in the morning before breakfast  *Aim for 30-45 grams of carbs per meal  *Fasting blood sugar goal is <140  *Continue to walk 20 minutes per day  *Use credible sources for information such as American Diabetes Association or CDC    FOLLOW-UP:    * with Amy at 1pm    Time spent with patient at today's visit was 70 minutes.      Amy Jesus RN, [facility]  Diabetes Education Department  [facility], [place]  Phone: [phone]  [phone]      Any diabetes medication dose changes were made via the CDE Protocol and Collaborative Practice Agreement with Ste[facility] and  Inge.  A copy of this encounter was provided to patient's referring provider.

## 2024-06-10 NOTE — PATIENT INSTRUCTIONS
PLAN:  *Check blood sugar 3 times per week in the morning before breakfast  *Aim for 30-45 grams of carbs per meal  *Fasting blood sugar goal is <140  *Continue to walk 20 minutes per day  *Use credible sources for information such as American Diabetes Association or CDC    FOLLOW-UP:    *7/30 with Amy at 1pm

## 2024-06-11 ENCOUNTER — TELEPHONE (OUTPATIENT)
Dept: BEHAVIORAL HEALTH | Facility: CLINIC | Age: 74
End: 2024-06-11

## 2024-06-11 ENCOUNTER — OFFICE VISIT (OUTPATIENT)
Dept: FAMILY MEDICINE | Facility: OTHER | Age: 74
End: 2024-06-11
Payer: COMMERCIAL

## 2024-06-11 VITALS
BODY MASS INDEX: 32.1 KG/M2 | HEART RATE: 72 BPM | SYSTOLIC BLOOD PRESSURE: 110 MMHG | DIASTOLIC BLOOD PRESSURE: 68 MMHG | WEIGHT: 188 LBS | TEMPERATURE: 98 F | RESPIRATION RATE: 16 BRPM | HEIGHT: 64 IN | OXYGEN SATURATION: 94 %

## 2024-06-11 DIAGNOSIS — F33.42 RECURRENT MAJOR DEPRESSIVE DISORDER, IN FULL REMISSION (H): ICD-10-CM

## 2024-06-11 DIAGNOSIS — F41.9 ANXIETY: Primary | ICD-10-CM

## 2024-06-11 DIAGNOSIS — E11.65 TYPE 2 DIABETES MELLITUS WITH HYPERGLYCEMIA, WITHOUT LONG-TERM CURRENT USE OF INSULIN (H): ICD-10-CM

## 2024-06-11 PROCEDURE — 99214 OFFICE O/P EST MOD 30 MIN: CPT | Performed by: FAMILY MEDICINE

## 2024-06-11 RX ORDER — BUSPIRONE HYDROCHLORIDE 5 MG/1
5 TABLET ORAL 3 TIMES DAILY
COMMUNITY
Start: 2024-06-11 | End: 2024-07-18

## 2024-06-11 RX ORDER — MIRTAZAPINE 7.5 MG/1
1 TABLET, FILM COATED ORAL AT BEDTIME
COMMUNITY
Start: 2024-06-03 | End: 2024-07-03

## 2024-06-11 RX ORDER — CITALOPRAM HYDROBROMIDE 20 MG/1
20 TABLET ORAL DAILY
COMMUNITY
Start: 2024-06-11

## 2024-06-11 ASSESSMENT — ASTHMA QUESTIONNAIRES
ACT_TOTALSCORE: 25
QUESTION_5 LAST FOUR WEEKS HOW WOULD YOU RATE YOUR ASTHMA CONTROL: COMPLETELY CONTROLLED
QUESTION_3 LAST FOUR WEEKS HOW OFTEN DID YOUR ASTHMA SYMPTOMS (WHEEZING, COUGHING, SHORTNESS OF BREATH, CHEST TIGHTNESS OR PAIN) WAKE YOU UP AT NIGHT OR EARLIER THAN USUAL IN THE MORNING: NOT AT ALL
QUESTION_1 LAST FOUR WEEKS HOW MUCH OF THE TIME DID YOUR ASTHMA KEEP YOU FROM GETTING AS MUCH DONE AT WORK, SCHOOL OR AT HOME: NONE OF THE TIME
ACT_TOTALSCORE: 25
QUESTION_4 LAST FOUR WEEKS HOW OFTEN HAVE YOU USED YOUR RESCUE INHALER OR NEBULIZER MEDICATION (SUCH AS ALBUTEROL): NOT AT ALL
QUESTION_2 LAST FOUR WEEKS HOW OFTEN HAVE YOU HAD SHORTNESS OF BREATH: NOT AT ALL

## 2024-06-11 ASSESSMENT — PAIN SCALES - GENERAL: PAINLEVEL: NO PAIN (0)

## 2024-06-11 NOTE — PROGRESS NOTES
Assessment & Plan     Anxiety/Recurrent major depressive disorder, in full remission (H24)  Discussed that at this time her mental health is the most important thing to focus on.  She will continue to follow with psychiatry and counseling.  She has appointments coming up.  She will continue to have her medications managed by them.    - busPIRone (BUSPAR) 5 MG tablet; Take 1 tablet (5 mg) by mouth 3 times daily  - citalopram (CELEXA) 20 MG tablet; Take 1.5 tablets (30 mg) by mouth daily    Type 2 diabetes mellitus with hyperglycemia, without long-term current use of insulin (H)  We discussed that right now she seems to be doing well on diet controlling her diabetes.  We discussed that at this point that she does not need insulin and that she may never need insulin if she is able to keep this controlled with diet.  She has been very fearful that she would need insulin right away.  We also tried to discuss that the statin was because she had diabetes and not because her levels were off.  She will continue to stay off of this and could always consider rediscussing this in the future but right now no that this causes her a great deal of anxiety and would prefer to focus on her mental health.      MED REC REQUIRED  Post Medication Reconciliation Status:  Discharge medications reconciled, continue medications without change  Depression Screening Follow Up    Follow Up Actions Taken  Crisis resource information provided in the After Visit Summary  Referred patient back to mental health provider    Discussed the following ways the patient can remain in a safe environment:  be around others      Delilah Sinha is a 74 year old, presenting for the following health issues:  Hospital F/U        6/11/2024     9:15 AM   Additional Questions   Roomed by ayaan SWEET     Hospital Follow-up Visit:    Hospital/Nursing Home/ Rehab Facility:  [facility]  Date of Admission: 5/29/30  Date of Discharge: 6/3/24  Reason(s) for  Admission: suicidal ideation  Was the patient in the ICU or did the patient experience delirium during hospitalization?  No  Do you have any other stressors you would like to discuss with your provider? No    Problems taking medications regularly:  None  Medication changes since discharge: None  Problems adhering to non-medication therapy:  None    Summary of hospitalization:  CareEverywhere information obtained and reviewed  Diagnostic Tests/Treatments reviewed.  Follow up needed: none  Other Healthcare Providers Involved in Patient s Care:         Specialist appointment - psychiatry  Update since discharge: stable.         Plan of care communicated with patient           Patient was seeking help for her mental health.  She had an intake with mental health on 5/29/2024 and was being referred for partial hospitalization treatment program.  She also started seeing psychiatry and had increased her Celexa from 20 mg to 30 mg daily and her BuSpar was increased from 5 mg twice daily to 3 times a day.  Patient had worsening thoughts of suicidal ideation and went to the emergency department.  She was then hospitalized.  Remeron was started.  She was discharged and asked to follow-up with PCP as well as psychiatry.  She is following with Pys[facility]atry at [facility] Dr. Casas.  She had an intake through [facility] last Friday who will be connecting with her psychiatrist and seeing how they can collaborate with care.  She has an appointment with her psychiatrist later this afternoon.    Overall she is unsure if she feels much different.  She states that others have told her she is improving and so she is hoping that is the case.  She feels the Remeron has been helping her out for sleep.  She admits to getting overly worried about things such as her diabetes.  She states she had a visit with the diabetes educator and felt that it went well.  She now feels that she is able to eat more.  She had been really  "restricting her diet and had lost a lot of weight but this was mainly as she was concerned about what she was eating in relation to diabetes.  She had a lot of her questions addressed.  She feels better not being on a statin medication as she states that was causing her a lot of panic.  She is frustrated that she is not able to cope with these diagnoses.  She is frustrated that she is not acting her usual self and that she is having difficulty applying rational reasoning to the issues in her life.      Objective    /68 (BP Location: Left arm, Patient Position: Sitting, Cuff Size: Adult Regular)   Pulse 72   Temp 98  F (36.7  C) (Temporal)   Resp 16   Ht 1.626 m (5' 4\")   Wt 85.3 kg (188 lb)   LMP 01/01/2005   SpO2 94%   BMI 32.27 kg/m    Body mass index is 32.27 kg/m .  Physical Exam   Gen: no apparent distress  Psych: Alert and oriented times 3; coherent speech, normal   rate and volume, able to articulate logical thoughts, able   to abstract reason, no tangential thoughts, no hallucinations   or delusions  Her affect is neutral          Signed Electronically by: Precious Hay MD    Answers submitted by the patient for this visit:  Patient Health Questionnaire (Submitted on 6/7/2024)  If you checked off any problems, how difficult have these problems made it for you to do your work, take care of things at home, or get along with other people?: Somewhat difficult  PHQ9 TOTAL SCORE: 8  SAGE-7 (Submitted on 6/7/2024)  SAGE 7 TOTAL SCORE: 6    "

## 2024-06-11 NOTE — TELEPHONE ENCOUNTER
**Patient Navigator Follow Up**    6/11/2024;    Referral for IOP-ADT; 55 plus  Are there any potential barriers for entrance into programmatic care? Transportation     Followed up from  Needed?:  No     Mental Health Referral Needed: Yes: needs individual therapist for depression, male or female, virtual preferred      Release of Information Needed:       Faxing Needed:      Follow up Requests:  Patient Navigator Coordinator Follow-Up Needed: Other:  Pt would like to get started in programming but distance is significant so would like information about programs closer to her that are similar to the ones at the [facility]      **Referral form was submitted to Soco FLORES (KEVIN) and she will connect with patient and go over programming options closer to her.  Additionally, Soco will schedule patient for Therapy referral as well in the Community.        6/13/2024;    Soco FLORES (KEVIN) reached patient and circled back to the Navigator.    She said that patient's direction has now changed and patient would like [facility] and is going to drive closer to [facility] and then use   [employer] to get transportation the rest of the way.    I called the patient to verify this plan is the case and discuss [facility] programming with her; 55 plus.    **Originally the Clinician referred her to external programming due to her distance, she currently resides in [place]. However, patient told KEVIN that she wants 55 plus [facility] programming. There was also a Transportation barrier. Our SW provided her the [employer] phone # and website to her [facility].  When I called and spoke to patient she sounded unsure and I don't think she fully grasped that she would be scheduling the rides and the SW would not be making those for her. I reiterated that.  Per KEVIN, she would be driving from [place] and then somewhere closer and getting the medical transportation from there to programming.    Patient mentioned she prefers afternoons and I added her to the 55 plus afternoons  waitlist.  Program will follow up with her accordingly.     Thank you,    Marya RAPHAEL  Patient Navigator

## 2024-06-11 NOTE — TELEPHONE ENCOUNTER
Summary of Patient Care Communication Handoff to Patient Navigator Coordinator    PATIENT'S NAME: Bharath Kennedy  MRN:   [id number]  :   1950    DATE OF SERVICE: 24    Referral Needed: Yes    Is the patient coming from an inpatient unit? No    What program is this referral for? Adult Mental Health Referral    Level of Care Recommended:  Combined Intensive Outpatient Day Treatment Program (IOP-ADT) / Adult Day Treatment Program (ADT)    Specialty Track Recommendations:   Specialty Tracks: 55 Plus    Schedule Preferences: Schedule Preference:  No schedule preference (Mornings or Afternoons)    Are there any potential barriers for entrance into programmatic care? Transportation    Followed up from  Needed?:  No    Mental Health Referral Needed: Yes: needs individual therapist for depression, male or female, virtual preferred     Release of Information Needed:      Faxing Needed:     Follow up Requests:  Patient Navigator Coordinator Follow-Up Needed: Other:  Pt would like to get started in programming but distance is significant so would like information about programs closer to her that are similar to the ones at the [facility]     Comments:     Shweta Burton Psy.D, LP        Patient Navigator Coordinator Contact Information  Pool Message: [email or web address] ([phone])   Phone:  [phone]  Fax:  [phone]  Email:  [email or web address]

## 2024-06-12 ENCOUNTER — TELEPHONE (OUTPATIENT)
Dept: BEHAVIORAL HEALTH | Facility: CLINIC | Age: 74
End: 2024-06-12
Payer: COMMERCIAL

## 2024-06-13 ENCOUNTER — TELEPHONE (OUTPATIENT)
Dept: BEHAVIORAL HEALTH | Facility: CLINIC | Age: 74
End: 2024-06-13
Payer: COMMERCIAL

## 2024-06-13 NOTE — TELEPHONE ENCOUNTER
[facility]       Referral Date: 6/11/24    Reason for Referral:  External Resources / Therapy    Referral Status: (urgent or general)  Urgent    Method of Communication:   Phone call    Plan or goal of contact/ previous contact information:   SW spoke with patient.    Follow up required:   F/U if patient requests.    Work done on case:   SW discussed referral with patient.     SW assisted patient with scheduling an individual therapy appointment for 6/18/24.    Patient stated she would like to attend the 55+ program with [facility]. Patient does not want to attend an external program. SW sent resources for transportation and will connect with [facility].       Important Information and next steps:     Date: Tuesday, 6/18/2024  Time: 1:00 pm - 2:00 pm  Provider: Maria Dolores Briones PsyD, LP  Location[facility], [address], [place] [address]  Phone: [phone]  Type: Teletherapy        PAULA Arroyo   - [facility]  [facility]  [email or web address]  [phone]

## 2024-06-13 NOTE — TELEPHONE ENCOUNTER
Writer spoke with patient on que and sent teams message for navigator follow up call.    Makayla Olivarez  06/13/2024  1119

## 2024-06-18 ENCOUNTER — TELEPHONE (OUTPATIENT)
Dept: BEHAVIORAL HEALTH | Facility: CLINIC | Age: 74
End: 2024-06-18

## 2024-06-18 NOTE — TELEPHONE ENCOUNTER
Writer contacted patient to discuss programming and current waitlist status; also wanted to provide patient an opportunity to ask questions regarding the program. Writer requested a call back to [phone].    KIMBERLY Severino on 6/18/2024 at 9:54 AM

## 2024-07-13 ASSESSMENT — ANXIETY QUESTIONNAIRES
IF YOU CHECKED OFF ANY PROBLEMS ON THIS QUESTIONNAIRE, HOW DIFFICULT HAVE THESE PROBLEMS MADE IT FOR YOU TO DO YOUR WORK, TAKE CARE OF THINGS AT HOME, OR GET ALONG WITH OTHER PEOPLE: SOMEWHAT DIFFICULT
GAD7 TOTAL SCORE: 5
7. FEELING AFRAID AS IF SOMETHING AWFUL MIGHT HAPPEN: SEVERAL DAYS
3. WORRYING TOO MUCH ABOUT DIFFERENT THINGS: SEVERAL DAYS
6. BECOMING EASILY ANNOYED OR IRRITABLE: NOT AT ALL
GAD7 TOTAL SCORE: 5
4. TROUBLE RELAXING: SEVERAL DAYS
1. FEELING NERVOUS, ANXIOUS, OR ON EDGE: SEVERAL DAYS
7. FEELING AFRAID AS IF SOMETHING AWFUL MIGHT HAPPEN: SEVERAL DAYS
8. IF YOU CHECKED OFF ANY PROBLEMS, HOW DIFFICULT HAVE THESE MADE IT FOR YOU TO DO YOUR WORK, TAKE CARE OF THINGS AT HOME, OR GET ALONG WITH OTHER PEOPLE?: SOMEWHAT DIFFICULT
5. BEING SO RESTLESS THAT IT IS HARD TO SIT STILL: NOT AT ALL
2. NOT BEING ABLE TO STOP OR CONTROL WORRYING: SEVERAL DAYS

## 2024-07-18 ENCOUNTER — OFFICE VISIT (OUTPATIENT)
Dept: FAMILY MEDICINE | Facility: OTHER | Age: 74
End: 2024-07-18
Attending: FAMILY MEDICINE
Payer: COMMERCIAL

## 2024-07-18 VITALS
HEIGHT: 65 IN | RESPIRATION RATE: 13 BRPM | BODY MASS INDEX: 29.91 KG/M2 | HEART RATE: 71 BPM | OXYGEN SATURATION: 97 % | WEIGHT: 179.5 LBS | SYSTOLIC BLOOD PRESSURE: 101 MMHG | TEMPERATURE: 97.1 F | DIASTOLIC BLOOD PRESSURE: 64 MMHG

## 2024-07-18 DIAGNOSIS — E11.9 TYPE 2 DIABETES MELLITUS WITHOUT COMPLICATION, WITHOUT LONG-TERM CURRENT USE OF INSULIN (H): Primary | ICD-10-CM

## 2024-07-18 LAB
CHOLEST SERPL-MCNC: 160 MG/DL
FASTING STATUS PATIENT QL REPORTED: YES
HBA1C MFR BLD: 5.7 % (ref 0–5.6)
HDLC SERPL-MCNC: 50 MG/DL
LDLC SERPL CALC-MCNC: 96 MG/DL
NONHDLC SERPL-MCNC: 110 MG/DL
TRIGL SERPL-MCNC: 71 MG/DL

## 2024-07-18 PROCEDURE — 99213 OFFICE O/P EST LOW 20 MIN: CPT | Performed by: FAMILY MEDICINE

## 2024-07-18 PROCEDURE — 36415 COLL VENOUS BLD VENIPUNCTURE: CPT | Performed by: FAMILY MEDICINE

## 2024-07-18 PROCEDURE — 80061 LIPID PANEL: CPT | Performed by: FAMILY MEDICINE

## 2024-07-18 PROCEDURE — 83036 HEMOGLOBIN GLYCOSYLATED A1C: CPT | Performed by: FAMILY MEDICINE

## 2024-07-18 RX ORDER — MIRTAZAPINE 7.5 MG/1
15 TABLET, FILM COATED ORAL AT BEDTIME
COMMUNITY
Start: 2024-07-07

## 2024-07-18 RX ORDER — BUSPIRONE HYDROCHLORIDE 7.5 MG/1
1 TABLET ORAL
COMMUNITY
Start: 2024-06-11

## 2024-07-18 RX ORDER — LANCETS
EACH MISCELLANEOUS
COMMUNITY
Start: 2024-06-10

## 2024-07-18 RX ORDER — RESPIRATORY SYNCYTIAL VIRUS VACCINE 120MCG/0.5
0.5 KIT INTRAMUSCULAR ONCE
Qty: 1 EACH | Refills: 0 | Status: CANCELLED | OUTPATIENT
Start: 2024-07-18 | End: 2024-07-18

## 2024-07-18 ASSESSMENT — PAIN SCALES - GENERAL: PAINLEVEL: NO PAIN (0)

## 2024-07-18 ASSESSMENT — PATIENT HEALTH QUESTIONNAIRE - PHQ9: SUM OF ALL RESPONSES TO PHQ QUESTIONS 1-9: 4

## 2024-07-18 NOTE — PROGRESS NOTES
Assessment & Plan     Type 2 diabetes mellitus without complication, without long-term current use of insulin (H)  She has made significant lifestyle changes.  Anticipate improvement in her labs.  Follow up in 6 months.    - HEMOGLOBIN A1C  - Lipid panel reflex to direct LDL Non-fasting    Subjective   Bharath is a 74 year old, presenting for the following health issues:  Diabetes      7/18/2024     9:42 AM   Additional Questions   Roomed by kerry   Accompanied by self     History of Present Illness       Mental Health Follow-up:  Patient presents to follow-up on Depression & Anxiety.Patient's depression since last visit has been:  Better  The patient is not having other symptoms associated with depression.  Patient's anxiety since last visit has been:  Better  The patient is not having other symptoms associated with anxiety.  Any significant life events: No  Patient is not feeling anxious or having panic attacks.  Patient has no concerns about alcohol or drug use.    Diabetes:   She presents for follow up of diabetes.  She is checking home blood glucose a few times a week.   She checks blood glucose before meals.  Blood glucose is never over 200 and never under 70.  When her blood glucose is low, the patient is asymptomatic for confusion, blurred vision, lethargy and reports not feeling dizzy, shaky, or weak.   She has no concerns regarding her diabetes at this time.   She is not experiencing numbness or burning in feet, excessive thirst, blurry vision, weight changes or redness, sores or blisters on feet.           She eats 2-3 servings of fruits and vegetables daily.She consumes 0 sweetened beverage(s) daily.She exercises with enough effort to increase her heart rate 30 to 60 minutes per day.  She exercises with enough effort to increase her heart rate 6 days per week.   She is taking medications regularly.        4/19/2024     3:33 PM 6/7/2024     1:28 PM 7/13/2024     9:41 AM   SAGE-7 SCORE   Total Score 8 (mild  "anxiety) 6 (mild anxiety) 5 (mild anxiety)   Total Score 8 6    6 5            5/14/2024    10:24 AM 6/7/2024     1:14 PM 7/18/2024     9:48 AM   PHQ   PHQ-9 Total Score 24 8    8 4   Q9: Thoughts of better off dead/self-harm past 2 weeks Nearly every day Several days Not at all   F/U: Thoughts of suicide or self-harm Yes Yes    F/U: Self harm-plan Yes No    F/U: Self-harm action No No    F/U: Safety concerns No No         Has cut out refined sugar.  Not using white flour.  Has increased good fat and cut out bad fat.  Walking 30 minutes a day and bikes 20 minutes a day, staying very active during the day.    She continues to follow with psychiatry and feels her mood is continuing to improve.  She does not feel that she is quite back to her normal self but that she is getting close.  She states that her family members have also noticed a big improvement.  She admits that she is not quite as anxious as she used to be.    She reports having an asthma attack once that improved with use of her albuterol inhaler.      Objective    /64   Pulse 71   Temp 97.1  F (36.2  C) (Temporal)   Resp 13   Ht 1.638 m (5' 4.5\")   Wt 81.4 kg (179 lb 8 oz)   LMP 01/01/2005   SpO2 97%   BMI 30.34 kg/m    Body mass index is 30.34 kg/m .  Physical Exam   Gen: no apparent distress  Chest: clear to auscultation without wheeze, rale or rhonchi  Cor: regular rate and rhythm without murmur  Psych: Alert and oriented times 3; coherent speech, normal   rate and volume, able to articulate logical thoughts, able   to abstract reason, no tangential thoughts, no hallucinations   or delusions  Her affect is neutral        Signed Electronically by: Precious Hay MD    "

## 2024-07-25 RX ORDER — BUSPIRONE HYDROCHLORIDE 7.5 MG/1
7.5 TABLET ORAL
OUTPATIENT
Start: 2024-07-25

## 2024-07-29 RX ORDER — BUSPIRONE HYDROCHLORIDE 5 MG/1
5 TABLET ORAL 3 TIMES DAILY
Qty: 90 TABLET | Refills: 0 | OUTPATIENT
Start: 2024-07-29

## 2024-12-21 ENCOUNTER — HEALTH MAINTENANCE LETTER (OUTPATIENT)
Age: 74
End: 2024-12-21

## 2025-03-22 ENCOUNTER — HEALTH MAINTENANCE LETTER (OUTPATIENT)
Age: 75
End: 2025-03-22

## 2025-04-14 ENCOUNTER — PATIENT OUTREACH (OUTPATIENT)
Dept: CARE COORDINATION | Facility: CLINIC | Age: 75
End: 2025-04-14
Payer: COMMERCIAL

## 2025-04-28 ENCOUNTER — PATIENT OUTREACH (OUTPATIENT)
Dept: CARE COORDINATION | Facility: CLINIC | Age: 75
End: 2025-04-28
Payer: COMMERCIAL

## 2025-05-15 ENCOUNTER — TRANSFERRED RECORDS (OUTPATIENT)
Dept: HEALTH INFORMATION MANAGEMENT | Facility: CLINIC | Age: 75
End: 2025-05-15
Payer: COMMERCIAL

## 2025-06-14 ENCOUNTER — HEALTH MAINTENANCE LETTER (OUTPATIENT)
Age: 75
End: 2025-06-14

## 2025-06-20 ENCOUNTER — RESULTS FOLLOW-UP (OUTPATIENT)
Dept: FAMILY MEDICINE | Facility: OTHER | Age: 75
End: 2025-06-20

## 2025-06-20 ENCOUNTER — ANCILLARY PROCEDURE (OUTPATIENT)
Dept: ULTRASOUND IMAGING | Facility: OTHER | Age: 75
End: 2025-06-20
Attending: FAMILY MEDICINE
Payer: COMMERCIAL

## 2025-06-20 DIAGNOSIS — N95.0 POSTMENOPAUSAL BLEEDING: ICD-10-CM

## 2025-06-20 DIAGNOSIS — N95.0 POSTMENOPAUSAL BLEEDING: Primary | ICD-10-CM

## 2025-06-20 PROBLEM — E66.01 CLASS 2 SEVERE OBESITY DUE TO EXCESS CALORIES WITH SERIOUS COMORBIDITY IN ADULT (H): Status: RESOLVED | Noted: 2024-04-11 | Resolved: 2025-06-20

## 2025-06-20 PROBLEM — E66.812 CLASS 2 SEVERE OBESITY DUE TO EXCESS CALORIES WITH SERIOUS COMORBIDITY IN ADULT (H): Status: RESOLVED | Noted: 2024-04-11 | Resolved: 2025-06-20

## 2025-06-20 PROCEDURE — 76830 TRANSVAGINAL US NON-OB: CPT | Mod: TC | Performed by: RADIOLOGY

## 2025-06-20 PROCEDURE — 76856 US EXAM PELVIC COMPLETE: CPT | Mod: TC | Performed by: RADIOLOGY

## 2025-06-24 ENCOUNTER — HOSPITAL ENCOUNTER (OUTPATIENT)
Facility: AMBULATORY SURGERY CENTER | Age: 75
End: 2025-06-24
Attending: STUDENT IN AN ORGANIZED HEALTH CARE EDUCATION/TRAINING PROGRAM
Payer: COMMERCIAL

## 2025-06-24 ENCOUNTER — TELEPHONE (OUTPATIENT)
Dept: GASTROENTEROLOGY | Facility: CLINIC | Age: 75
End: 2025-06-24

## 2025-06-24 ENCOUNTER — PATIENT OUTREACH (OUTPATIENT)
Dept: CARE COORDINATION | Facility: CLINIC | Age: 75
End: 2025-06-24

## 2025-06-24 DIAGNOSIS — Z12.11 SPECIAL SCREENING FOR MALIGNANT NEOPLASMS, COLON: Primary | ICD-10-CM

## 2025-06-24 RX ORDER — BISACODYL 5 MG/1
TABLET, DELAYED RELEASE ORAL
Qty: 4 TABLET | Refills: 0 | Status: SHIPPED | OUTPATIENT
Start: 2025-06-24 | End: 2025-06-24

## 2025-06-24 NOTE — TELEPHONE ENCOUNTER
"Endoscopy Scheduling Screen    Caller: patient    Have you had any respiratory illness or flu-like symptoms in the last 10 days?  No    Patient is ACTIVE on [employer].  Inform patient that all appointment instructions will be sent via [employer].    Review patient's insurance for any non participating payor.    Ordering/Referring Provider: [facility]    (If ordering provider performs procedure, schedule with ordering provider unless otherwise instructed. )    BMI: Estimated body mass index is 32.62 kg/m  as calculated from the following:    Height as of 6/20/25: 1.638 m (5' 4.5\").    Weight as of 6/20/25: 87.5 kg (193 lb).     Sedation Ordered  moderate sedation.   If patient BMI > 50 do not schedule in ASC.    If patient BMI > 45 [facility]    Are you taking methadone or Suboxone?  NO, No RN review required.    Have you been diagnosed and are being treated for severe PTSD or severe anxiety?  NO, No RN review required. - medicated/controlled     Are you taking any prescription medications for pain 3 or more times per week?   NO, No RN review required.    Do you have a history of malignant hyperthermia?  No    (Females) Are you currently pregnant?   No     Have you been diagnosed or told you have pulmonary hypertension?   No    Do you have an LVAD?  No    Have you been told you have moderate to severe sleep apnea?  No.    Have you been told you have COPD, asthma, or any other lung disease?  Yes     What breathing problems do you have?  Asthma     Do you use home oxygen?  No    Have your breathing problems required an ED visit or hospitalization in the last year?  No.    Has your doctor ordered any cardiac tests like echo, angiogram, stress test, ablation, or EKG, that you have not completed yet?  No    Do you  have a history of any heart conditions?  No     Have you ever had or are you waiting for an organ transplant?  No. Continue scheduling, no site restrictions.    Have you had a stroke or transient ischemic " "attack (TIA aka \"mini stroke\") in the last 2 years?   No.    Have you been diagnosed with or been told you have cirrhosis of the liver?   No.    Are you currently on dialysis?   No    Do you need assistance transferring?   No    BMI: Estimated body mass index is 32.62 kg/m  as calculated from the following:    Height as of 6/20/25: 1.638 m (5' 4.5\").    Weight as of 6/20/25: 87.5 kg (193 lb).     Is patients BMI > 40 and scheduling location UP?  No    Do you take an injectable or oral medication for weight loss or diabetes (excluding insulin)?  No    Do you take the medication Naltrexone?  No    Do you take blood thinners?  No       Prep   Are you currently on dialysis or do you have chronic kidney disease?  No    Do you have a diagnosis of diabetes?  Yes ([facility] Prep)    Do you have a diagnosis of cystic fibrosis (CF)?  No    On a regular basis do you go 3 -5 days between bowel movements?  No    BMI > 40?  No    Preferred Pharmacy:    [id number] [address] [address]  Phone: [phone] Fax: [phone]      Final Scheduling Details     Procedure scheduled  Colonoscopy    Surgeon:  Yobani      Date of procedure:  07/02/2025     Pre-OP / PAC:   No - Not required for this site.    Location  [facility] - ASC - Patient preference.    Sedation   Moderate Sedation - Per order.      Patient Reminders:   You will receive a call from a Nurse to review instructions and health history.  This assessment must be completed prior to your procedure.  Failure to complete the Nurse assessment may result in the procedure being cancelled.      On the day of your procedure, please designate an adult(s) who can drive you home stay with you for the next 24 hours. The medicines used in the exam will make you sleepy. You will not be able to drive.      You cannot take public transportation, ride share services, or non-medical taxi service without a responsible caregiver.  Medical " transport services are allowed with the requirement that a responsible caregiver will receive you at your destination.  We require that drivers and caregivers are confirmed prior to your procedure.

## 2025-06-24 NOTE — TELEPHONE ENCOUNTER
Caller: No call made    Reason for Reschedule/Cancellation (please be detailed, any staff messages or encounters to note?):   Per RN pt no longer wishes to complete procedure    Did you cancel or rescheduled an EUS procedure? No.    Is screening questionnaire older than 3 months from the reschedule date.   If Yes, please complete screening questionnaire. No    Prior to reschedule please review:  Ordering Provider: Precious Hay MD  Sedation Determined: Moderate  Does patient have any ASC Exclusions, please identify?: No    Notes on Cancelled Procedure:  Procedure: Lower Endoscopy [Colonoscopy]   Date: 07/02/2025  Location: [facility]; [address] [place] [address] [address], [place] [address]   Surgeon: BLACK    Rescheduled: No,

## 2025-06-24 NOTE — TELEPHONE ENCOUNTER
Contacted patient to complete PA.  Patient states she wants to cancel this procedure as she does not feel like it is needed.  Informed patient that I would send a message to scheduling to have this canceled and if at any time she would like to reschedule she could contact scheduling at [phone] option 1.  Patient verbalizes an understanding.    --------------------------------------------------------------------------------------------------------------------      Procedure details:    Patient scheduled for Colonoscopy on 7/2/25.     Approximate arrival time: 1130. Procedure time 1215.   *Ensure patient is aware that endoscopy team will be calling about 2 days prior to procedure date to confirm arrival time as this may change.     Facility location: [facility] [place]; [address]nd Floor, [place] [address]. Check in location: 2nd Floor at Surgery desk.  *Disclaimer: Drivers are to check in with patient and stay on campus during procedure.     Sedation type: Conscious sedation     Pre op exam needed? No.    Indication for procedure: Screening       Chart review:     Electronic implanted devices? No    Recent diagnosis of diverticulitis within the last 6 weeks? No      Medication review:    Diabetic? Yes. Not on diabetic medication    Anticoagulants? No    Weight loss medication/injectable? No GLP-1 medication per patient's medication list. Nursing to verify with pre-assessment call.    Other medication HOLDING recommendations:  N/A      Prep for procedure:     Bowel prep recommendation: Standard Golytely. Bowel prep sent to    [facility] [address] [place] [id number] [facility]    Due to: diabetes    Procedure information and instructions sent via [employer]         Jenn Polo RN  Endoscopy Procedure Pre Assessment   [phone] option 3

## 2025-06-30 NOTE — PATIENT INSTRUCTIONS
If you have any questions regarding your visit, Please contact your care team.     [facility] [facility]: [phone]    To Schedule an Appointment 24/7  Call: [phone]LE[facility]     Orlando Viveros MD  Medical Director    Dana Bearden-RN  Aubrie-RN  Bonine Vance-Surgery Scheduler  Diane-       Tuesday-[place]  7:30 a.m-4:30 p.m    Wednesday-[place] 7:30 a.m-4:30 p.m    Thursday-[place]  7:30 a.m-4:30 p.m    Typical Surgery Days: Tuesday or Friday [facility] [place]  [address] [place] [address]  PH: [phone]    Imaging Scheduling all locations   [phone] ([place])    [facility]  [address]   [place] [address]  PH: [phone]    [facility]  [address] [place] [address]  PH: [phone] Fax      **Surgeries** Our Surgery Schedulers will contact you to schedule. If you do not receive a call within 3 business days, please call [phone].    Urgent Care locations:  [facility] Monday-Friday  10 am - 8 pm  Saturday and Sunday   9 am - 5 pm  Monday-Friday   10 am - 8 pm  Saturday and Sunday   9 am - 5 pm   [phone]   If you need a medication refill, please contact your pharmacy. Please allow 3 business days for your refill to be completed.  As always, Thank you for trusting us with your healthcare needs!    see additional instructions from your care team below

## 2025-07-03 ENCOUNTER — OFFICE VISIT (OUTPATIENT)
Dept: OBGYN | Facility: CLINIC | Age: 75
End: 2025-07-03
Payer: COMMERCIAL

## 2025-07-03 VITALS — DIASTOLIC BLOOD PRESSURE: 80 MMHG | SYSTOLIC BLOOD PRESSURE: 141 MMHG | OXYGEN SATURATION: 99 % | HEART RATE: 83 BPM

## 2025-07-03 DIAGNOSIS — N95.0 POSTMENOPAUSAL BLEEDING: ICD-10-CM

## 2025-07-03 NOTE — PROGRESS NOTES
Bharath is a 75 year old   is here today complaining of 1 episode of vaginal bleeding.  She denies any other concern.       ROS: Pertinent ROS as above.    Gyn Hx:     Past Medical History:   Diagnosis Date    Acne vulgaris     Arthritis     Depressive disorder     Depressive disorder, not elsewhere classified     Diabetes mellitus, type 2 (H) 2024    Displacement of intervertebral disc, site unspecified, without myelopathy     L5-S1     Moderate persistent asthma without complication 2016    Obesity, unspecified      Past Surgical History:   Procedure Laterality Date    COLONOSCOPY  2008    [facility]    EYE SURGERY  2017 and 2018    HC TOOTH EXTRACTION W/FORCEP      LAMINECT/DISCECTOMY, LUMBAR  2008    Left L5-S1 discectomy for herniated disc & left leg pain.    TONSILLECTOMY           ALL/Meds: Her medication and allergy histories were reviewed and are documented in their appropriate chart areas.    SH: Reviewed and documented in the appropriate area of the chart.  FH:  Her family history is reviewed and updated in the chart, today.  PMH: Her past medical, surgical, and obstetric histories were reviewed and updated today in the appropriate chart areas.    PE: LMP 2005   There is no height or weight on file to calculate BMI.    Pertinent Physical exam findings:    General Appearance:  healthy, alert, active, no distress    U/S:  UTERUS: 7.3 x 3.5 x 4.4 cm. Anteverted. Slightly heterogeneous in appearance. Nabothian cysts.     ENDOMETRIUM: 1 mm. Small amount of fluid is present within the endometrial canal.     The ovaries are not visualized and likely obscured by bowel gas. No significant free fluid.                                                                      IMPRESSION:  1.  Small amount of fluid within the endometrial canal is nonspecific. Findings may be due to an occult underlying endometrial lesion or cervical stenosis.  2.  Nonvisualization of the ovaries.    I  discussed the concerns related to post menopausal bleeding, including the association with endometrial hyperplasia and endometrial carcinoma.  We discussed the other potential causes for bleeding.  She has had a pelvic ultrasound.    I discussed the concerns related to a thicker endometrial strip and the increased association of an endometrial abnormality with a thickness of greater than or equal to 5 mm.  She appears to understand.    Given the ultrasound, I don't think a biopsy is warranted at this time.         A/P:(N95.0) Postmenopausal bleeding  Comment: 30 minutes spent on the date of the encounter doing chart review, review of test results, patient visit, and documentation    Plan: Follow up as needed           - No orders of the defined types were placed in this encounter.